# Patient Record
Sex: MALE | Race: WHITE | Employment: OTHER | URBAN - NONMETROPOLITAN AREA
[De-identification: names, ages, dates, MRNs, and addresses within clinical notes are randomized per-mention and may not be internally consistent; named-entity substitution may affect disease eponyms.]

---

## 2017-06-14 ENCOUNTER — OFFICE VISIT (OUTPATIENT)
Dept: FAMILY MEDICINE CLINIC | Age: 82
End: 2017-06-14

## 2017-06-14 VITALS
WEIGHT: 165 LBS | OXYGEN SATURATION: 98 % | DIASTOLIC BLOOD PRESSURE: 82 MMHG | HEIGHT: 70 IN | BODY MASS INDEX: 23.62 KG/M2 | SYSTOLIC BLOOD PRESSURE: 136 MMHG | HEART RATE: 53 BPM

## 2017-06-14 DIAGNOSIS — E78.5 HYPERLIPIDEMIA, UNSPECIFIED HYPERLIPIDEMIA TYPE: ICD-10-CM

## 2017-06-14 DIAGNOSIS — I10 ESSENTIAL HYPERTENSION: ICD-10-CM

## 2017-06-14 DIAGNOSIS — Z23 NEED FOR PNEUMOCOCCAL VACCINATION: ICD-10-CM

## 2017-06-14 DIAGNOSIS — I10 ESSENTIAL HYPERTENSION: Primary | ICD-10-CM

## 2017-06-14 LAB
ALBUMIN SERPL-MCNC: 4 G/DL (ref 3.9–4.9)
ALP BLD-CCNC: 85 U/L (ref 35–104)
ALT SERPL-CCNC: 19 U/L (ref 0–41)
ANION GAP SERPL CALCULATED.3IONS-SCNC: 10 MEQ/L (ref 7–13)
AST SERPL-CCNC: 25 U/L (ref 0–40)
BILIRUB SERPL-MCNC: 0.5 MG/DL (ref 0–1.2)
BUN BLDV-MCNC: 24 MG/DL (ref 8–23)
CALCIUM SERPL-MCNC: 9.2 MG/DL (ref 8.6–10.2)
CHLORIDE BLD-SCNC: 104 MEQ/L (ref 98–107)
CHOLESTEROL, TOTAL: 125 MG/DL (ref 0–199)
CO2: 25 MEQ/L (ref 22–29)
CREAT SERPL-MCNC: 1.08 MG/DL (ref 0.7–1.2)
GFR AFRICAN AMERICAN: >60
GFR NON-AFRICAN AMERICAN: >60
GLOBULIN: 2.7 G/DL (ref 2.3–3.5)
GLUCOSE BLD-MCNC: 107 MG/DL (ref 74–109)
HCT VFR BLD CALC: 41.9 % (ref 42–52)
HDLC SERPL-MCNC: 44 MG/DL (ref 40–59)
HEMOGLOBIN: 13.9 G/DL (ref 14–18)
LDL CHOLESTEROL CALCULATED: 62 MG/DL (ref 0–129)
MCH RBC QN AUTO: 32.3 PG (ref 27–31.3)
MCHC RBC AUTO-ENTMCNC: 33.3 % (ref 33–37)
MCV RBC AUTO: 97.3 FL (ref 80–100)
PDW BLD-RTO: 12.3 % (ref 11.5–14.5)
PLATELET # BLD: 167 K/UL (ref 130–400)
POTASSIUM SERPL-SCNC: 4.4 MEQ/L (ref 3.5–5.1)
RBC # BLD: 4.3 M/UL (ref 4.7–6.1)
SODIUM BLD-SCNC: 139 MEQ/L (ref 132–144)
TOTAL PROTEIN: 6.7 G/DL (ref 6.4–8.1)
TRIGL SERPL-MCNC: 93 MG/DL (ref 0–200)
WBC # BLD: 6.1 K/UL (ref 4.8–10.8)

## 2017-06-14 PROCEDURE — G8420 CALC BMI NORM PARAMETERS: HCPCS | Performed by: FAMILY MEDICINE

## 2017-06-14 PROCEDURE — 90732 PPSV23 VACC 2 YRS+ SUBQ/IM: CPT | Performed by: FAMILY MEDICINE

## 2017-06-14 PROCEDURE — G0009 ADMIN PNEUMOCOCCAL VACCINE: HCPCS | Performed by: FAMILY MEDICINE

## 2017-06-14 PROCEDURE — 1123F ACP DISCUSS/DSCN MKR DOCD: CPT | Performed by: FAMILY MEDICINE

## 2017-06-14 PROCEDURE — 99213 OFFICE O/P EST LOW 20 MIN: CPT | Performed by: FAMILY MEDICINE

## 2017-06-14 PROCEDURE — 93000 ELECTROCARDIOGRAM COMPLETE: CPT | Performed by: FAMILY MEDICINE

## 2017-06-14 PROCEDURE — 1036F TOBACCO NON-USER: CPT | Performed by: FAMILY MEDICINE

## 2017-06-14 PROCEDURE — G8427 DOCREV CUR MEDS BY ELIG CLIN: HCPCS | Performed by: FAMILY MEDICINE

## 2017-06-14 PROCEDURE — 4040F PNEUMOC VAC/ADMIN/RCVD: CPT | Performed by: FAMILY MEDICINE

## 2017-06-14 RX ORDER — LISINOPRIL AND HYDROCHLOROTHIAZIDE 12.5; 1 MG/1; MG/1
1 TABLET ORAL DAILY
Qty: 90 TABLET | Refills: 3 | Status: SHIPPED | OUTPATIENT
Start: 2017-06-14 | End: 2018-04-12 | Stop reason: SDUPTHER

## 2017-06-14 RX ORDER — SIMVASTATIN 20 MG
20 TABLET ORAL NIGHTLY
Qty: 90 TABLET | Refills: 3 | Status: SHIPPED | OUTPATIENT
Start: 2017-06-14 | End: 2018-04-12 | Stop reason: SDUPTHER

## 2017-06-14 RX ORDER — METOPROLOL SUCCINATE 25 MG/1
25 TABLET, EXTENDED RELEASE ORAL DAILY
Qty: 90 TABLET | Refills: 3 | Status: SHIPPED | OUTPATIENT
Start: 2017-06-14 | End: 2018-04-12 | Stop reason: SDUPTHER

## 2017-06-14 ASSESSMENT — PATIENT HEALTH QUESTIONNAIRE - PHQ9
SUM OF ALL RESPONSES TO PHQ QUESTIONS 1-9: 0
SUM OF ALL RESPONSES TO PHQ9 QUESTIONS 1 & 2: 0
2. FEELING DOWN, DEPRESSED OR HOPELESS: 0
1. LITTLE INTEREST OR PLEASURE IN DOING THINGS: 0

## 2018-04-12 ENCOUNTER — OFFICE VISIT (OUTPATIENT)
Dept: FAMILY MEDICINE CLINIC | Age: 83
End: 2018-04-12
Payer: MEDICARE

## 2018-04-12 VITALS
HEIGHT: 70 IN | DIASTOLIC BLOOD PRESSURE: 76 MMHG | WEIGHT: 167 LBS | BODY MASS INDEX: 23.91 KG/M2 | SYSTOLIC BLOOD PRESSURE: 130 MMHG | HEART RATE: 59 BPM | OXYGEN SATURATION: 97 %

## 2018-04-12 DIAGNOSIS — I10 ESSENTIAL HYPERTENSION: ICD-10-CM

## 2018-04-12 DIAGNOSIS — E78.5 HYPERLIPIDEMIA, UNSPECIFIED HYPERLIPIDEMIA TYPE: ICD-10-CM

## 2018-04-12 LAB
ALBUMIN SERPL-MCNC: 3.9 G/DL (ref 3.9–4.9)
ALP BLD-CCNC: 89 U/L (ref 35–104)
ALT SERPL-CCNC: 22 U/L (ref 0–41)
ANION GAP SERPL CALCULATED.3IONS-SCNC: 10 MEQ/L (ref 7–13)
AST SERPL-CCNC: 25 U/L (ref 0–40)
BILIRUB SERPL-MCNC: 0.8 MG/DL (ref 0–1.2)
BUN BLDV-MCNC: 25 MG/DL (ref 8–23)
CALCIUM SERPL-MCNC: 9.1 MG/DL (ref 8.6–10.2)
CHLORIDE BLD-SCNC: 103 MEQ/L (ref 98–107)
CHOLESTEROL, TOTAL: 127 MG/DL (ref 0–199)
CO2: 27 MEQ/L (ref 22–29)
CREAT SERPL-MCNC: 1.14 MG/DL (ref 0.7–1.2)
GFR AFRICAN AMERICAN: >60
GFR NON-AFRICAN AMERICAN: >60
GLOBULIN: 2.8 G/DL (ref 2.3–3.5)
GLUCOSE BLD-MCNC: 95 MG/DL (ref 74–109)
HCT VFR BLD CALC: 43 % (ref 42–52)
HDLC SERPL-MCNC: 43 MG/DL (ref 40–59)
HEMOGLOBIN: 14.2 G/DL (ref 14–18)
LDL CHOLESTEROL CALCULATED: 65 MG/DL (ref 0–129)
MCH RBC QN AUTO: 32.9 PG (ref 27–31.3)
MCHC RBC AUTO-ENTMCNC: 33.1 % (ref 33–37)
MCV RBC AUTO: 99.3 FL (ref 80–100)
PDW BLD-RTO: 12.7 % (ref 11.5–14.5)
PLATELET # BLD: 174 K/UL (ref 130–400)
POTASSIUM SERPL-SCNC: 4.7 MEQ/L (ref 3.5–5.1)
RBC # BLD: 4.33 M/UL (ref 4.7–6.1)
SODIUM BLD-SCNC: 140 MEQ/L (ref 132–144)
TOTAL PROTEIN: 6.7 G/DL (ref 6.4–8.1)
TRIGL SERPL-MCNC: 93 MG/DL (ref 0–200)
WBC # BLD: 5.8 K/UL (ref 4.8–10.8)

## 2018-04-12 PROCEDURE — 99213 OFFICE O/P EST LOW 20 MIN: CPT | Performed by: FAMILY MEDICINE

## 2018-04-12 RX ORDER — LISINOPRIL AND HYDROCHLOROTHIAZIDE 12.5; 1 MG/1; MG/1
1 TABLET ORAL DAILY
Qty: 90 TABLET | Refills: 3 | Status: SHIPPED | OUTPATIENT
Start: 2018-04-12 | End: 2019-04-03 | Stop reason: SDUPTHER

## 2018-04-12 RX ORDER — ALFUZOSIN HYDROCHLORIDE 10 MG/1
TABLET, EXTENDED RELEASE ORAL
COMMUNITY
Start: 2018-03-16 | End: 2018-11-07 | Stop reason: SDUPTHER

## 2018-04-12 RX ORDER — METOPROLOL SUCCINATE 25 MG/1
25 TABLET, EXTENDED RELEASE ORAL DAILY
Qty: 90 TABLET | Refills: 3 | Status: SHIPPED | OUTPATIENT
Start: 2018-04-12 | End: 2019-04-03 | Stop reason: SDUPTHER

## 2018-04-12 RX ORDER — SIMVASTATIN 20 MG
20 TABLET ORAL NIGHTLY
Qty: 90 TABLET | Refills: 3 | Status: SHIPPED | OUTPATIENT
Start: 2018-04-12 | End: 2019-04-03 | Stop reason: SDUPTHER

## 2018-11-08 RX ORDER — FINASTERIDE 5 MG/1
5 TABLET, FILM COATED ORAL DAILY
Qty: 90 TABLET | Refills: 3 | Status: SHIPPED | OUTPATIENT
Start: 2018-11-08 | End: 2019-10-26 | Stop reason: SDUPTHER

## 2018-11-08 RX ORDER — ALFUZOSIN HYDROCHLORIDE 10 MG/1
10 TABLET, EXTENDED RELEASE ORAL DAILY
Qty: 90 TABLET | Refills: 3 | Status: SHIPPED | OUTPATIENT
Start: 2018-11-08 | End: 2019-10-26 | Stop reason: SDUPTHER

## 2019-04-03 ENCOUNTER — OFFICE VISIT (OUTPATIENT)
Dept: FAMILY MEDICINE CLINIC | Age: 84
End: 2019-04-03
Payer: MEDICARE

## 2019-04-03 VITALS
DIASTOLIC BLOOD PRESSURE: 76 MMHG | HEART RATE: 53 BPM | RESPIRATION RATE: 16 BRPM | WEIGHT: 167.6 LBS | OXYGEN SATURATION: 98 % | SYSTOLIC BLOOD PRESSURE: 140 MMHG | HEIGHT: 70 IN | BODY MASS INDEX: 23.99 KG/M2

## 2019-04-03 DIAGNOSIS — L57.0 AK (ACTINIC KERATOSIS): ICD-10-CM

## 2019-04-03 DIAGNOSIS — I10 ESSENTIAL HYPERTENSION: ICD-10-CM

## 2019-04-03 DIAGNOSIS — E78.5 HYPERLIPIDEMIA, UNSPECIFIED HYPERLIPIDEMIA TYPE: ICD-10-CM

## 2019-04-03 DIAGNOSIS — N40.1 BENIGN PROSTATIC HYPERPLASIA WITH WEAK URINARY STREAM: Primary | ICD-10-CM

## 2019-04-03 DIAGNOSIS — R39.12 BENIGN PROSTATIC HYPERPLASIA WITH WEAK URINARY STREAM: Primary | ICD-10-CM

## 2019-04-03 LAB
ALBUMIN SERPL-MCNC: 3.8 G/DL (ref 3.5–4.6)
ALP BLD-CCNC: 89 U/L (ref 35–104)
ALT SERPL-CCNC: 22 U/L (ref 0–41)
ANION GAP SERPL CALCULATED.3IONS-SCNC: 14 MEQ/L (ref 9–15)
AST SERPL-CCNC: 28 U/L (ref 0–40)
BILIRUB SERPL-MCNC: 0.6 MG/DL (ref 0.2–0.7)
BUN BLDV-MCNC: 25 MG/DL (ref 8–23)
CALCIUM SERPL-MCNC: 9.5 MG/DL (ref 8.5–9.9)
CHLORIDE BLD-SCNC: 105 MEQ/L (ref 95–107)
CHOLESTEROL, TOTAL: 117 MG/DL (ref 0–199)
CO2: 23 MEQ/L (ref 20–31)
CREAT SERPL-MCNC: 1.33 MG/DL (ref 0.7–1.2)
GFR AFRICAN AMERICAN: >60
GFR NON-AFRICAN AMERICAN: 50.9
GLOBULIN: 3.3 G/DL (ref 2.3–3.5)
GLUCOSE BLD-MCNC: 92 MG/DL (ref 70–99)
HCT VFR BLD CALC: 40.4 % (ref 42–52)
HDLC SERPL-MCNC: 46 MG/DL (ref 40–59)
HEMOGLOBIN: 13.7 G/DL (ref 14–18)
LDL CHOLESTEROL CALCULATED: 55 MG/DL (ref 0–129)
MCH RBC QN AUTO: 33 PG (ref 27–31.3)
MCHC RBC AUTO-ENTMCNC: 33.9 % (ref 33–37)
MCV RBC AUTO: 97.5 FL (ref 80–100)
PDW BLD-RTO: 12.5 % (ref 11.5–14.5)
PLATELET # BLD: 168 K/UL (ref 130–400)
POTASSIUM SERPL-SCNC: 4.7 MEQ/L (ref 3.4–4.9)
RBC # BLD: 4.14 M/UL (ref 4.7–6.1)
SODIUM BLD-SCNC: 142 MEQ/L (ref 135–144)
TOTAL PROTEIN: 7.1 G/DL (ref 6.3–8)
TRIGL SERPL-MCNC: 80 MG/DL (ref 0–150)
WBC # BLD: 5.7 K/UL (ref 4.8–10.8)

## 2019-04-03 PROCEDURE — G8510 SCR DEP NEG, NO PLAN REQD: HCPCS | Performed by: FAMILY MEDICINE

## 2019-04-03 PROCEDURE — 99213 OFFICE O/P EST LOW 20 MIN: CPT | Performed by: FAMILY MEDICINE

## 2019-04-03 PROCEDURE — 3288F FALL RISK ASSESSMENT DOCD: CPT | Performed by: FAMILY MEDICINE

## 2019-04-03 RX ORDER — SIMVASTATIN 20 MG
20 TABLET ORAL NIGHTLY
Qty: 90 TABLET | Refills: 3 | Status: SHIPPED | OUTPATIENT
Start: 2019-04-03 | End: 2020-04-25

## 2019-04-03 RX ORDER — LISINOPRIL AND HYDROCHLOROTHIAZIDE 12.5; 1 MG/1; MG/1
1 TABLET ORAL DAILY
Qty: 90 TABLET | Refills: 3 | Status: SHIPPED | OUTPATIENT
Start: 2019-04-03 | End: 2020-04-25

## 2019-04-03 RX ORDER — METOPROLOL SUCCINATE 25 MG/1
25 TABLET, EXTENDED RELEASE ORAL DAILY
Qty: 90 TABLET | Refills: 3 | Status: SHIPPED | OUTPATIENT
Start: 2019-04-03 | End: 2020-04-25

## 2019-04-03 ASSESSMENT — PATIENT HEALTH QUESTIONNAIRE - PHQ9
SUM OF ALL RESPONSES TO PHQ QUESTIONS 1-9: 0
1. LITTLE INTEREST OR PLEASURE IN DOING THINGS: 0
SUM OF ALL RESPONSES TO PHQ9 QUESTIONS 1 & 2: 0
SUM OF ALL RESPONSES TO PHQ QUESTIONS 1-9: 0
2. FEELING DOWN, DEPRESSED OR HOPELESS: 0

## 2019-04-03 NOTE — PROGRESS NOTES
Chief Complaint   Patient presents with    Annual Exam     yearly, no complaints. HPI:  Charito Brown is a 80 y.o. male   annual checkup/refills    Patient Active Problem List   Diagnosis    Hyperlipidemia    Hypertension    BPH (benign prostatic hyperplasia)     Seen annually     No longer in [place] for 1/2 year  Sold their place there    Feeling ok he states  Denies complaints    No family near by  Daughter in [address] [place] [address]  Daughter in [place]  Son in [place]      Urologist - Dr. Kamran Huynh    Treatment Adherence:   Medication compliance:  compliant all of the time  Diet compliance:  compliant all of the time  Weight trend: stable  Current exercise: mowing lawn, golfing  Barriers: none    Hypertension:  Home blood pressure monitoring: Yes - good. He is adherent to a low sodium diet. Patient denies chest pain, shortness of breath, headache, blurred vision, peripheral edema, palpitations, dry cough and fatigue. Antihypertensive medication side effects: no medication side effects noted and orthostatic lightheadedness. Use of agents associated with hypertension: none. Sodium (mEq/L)   Date Value   04/12/2018 140    BUN (mg/dL)   Date Value   04/12/2018 25 (H)    Glucose (mg/dL)   Date Value   04/12/2018 95      Potassium (mEq/L)   Date Value   04/12/2018 4.7    CREATININE (mg/dL)   Date Value   04/12/2018 1.14         Hyperlipidemia:  No new myalgias or GI upset on simvastatin (Zocor).      Lab Results   Component Value Date    CHOL 127 04/12/2018    TRIG 93 04/12/2018    HDL 43 04/12/2018    LDLCALC 65 04/12/2018     Lab Results   Component Value Date    ALT 22 04/12/2018    AST 25 04/12/2018          BPH:  Sees urology in [place]      Past Medical History:   Diagnosis Date    Hyperlipidemia     Hypertension, well controlled      Past Surgical History:   Procedure Laterality Date    CATARACT REMOVAL  5/2012    bilateral    COLONOSCOPY  7/23/2008     No family history on file. Social History     Socioeconomic History    Marital status:      Spouse name: None    Number of children: None    Years of education: None    Highest education level: None   Occupational History    None   Social Needs    Financial resource strain: None    Food insecurity:     Worry: None     Inability: None    Transportation needs:     Medical: None     Non-medical: None   Tobacco Use    Smoking status: Former Smoker    Smokeless tobacco: Never Used   Substance and Sexual Activity    Alcohol use: None    Drug use: None    Sexual activity: None   Lifestyle    Physical activity:     Days per week: None     Minutes per session: None    Stress: None   Relationships    Social connections:     Talks on phone: None     Gets together: None     Attends Episcopal service: None     Active member of club or organization: None     Attends meetings of clubs or organizations: None     Relationship status: None    Intimate partner violence:     Fear of current or ex partner: None     Emotionally abused: None     Physically abused: None     Forced sexual activity: None   Other Topics Concern    None   Social History Narrative    None     Current Outpatient Medications   Medication Sig Dispense Refill    finasteride (PROSCAR) 5 MG tablet Take 1 tablet by mouth daily 90 tablet 3    alfuzosin (UROXATRAL) 10 MG extended release tablet Take 1 tablet by mouth daily 90 tablet 3    metoprolol succinate (TOPROL XL) 25 MG extended release tablet Take 1 tablet by mouth daily 90 tablet 3    lisinopril-hydrochlorothiazide (PRINZIDE;ZESTORETIC) 10-12.5 MG per tablet Take 1 tablet by mouth daily 90 tablet 3    simvastatin (ZOCOR) 20 MG tablet Take 1 tablet by mouth nightly 90 tablet 3    aspirin 81 MG EC tablet Take 81 mg by mouth daily       No current facility-administered medications for this visit.       No Known Allergies    Review of Systems:   General ROS: some fatigue negative for - chills,fever, malaise, weight gain or weight loss  Respiratory ROS: no cough, shortness of breath, or wheezing  Cardiovascular ROS: no chest pain or dyspnea on exertion  Gastrointestinal ROS: no abdominal pain, change in bowel habits, or black or bloody stools  Genito-Urinary ROS: no dysuria, trouble voiding, hx BPH  Musculoskeletal ROS: negative for - gait disturbance, joint pain or joint stiffness  Neurological ROS: negative for - behavioral changes, memory loss, numbness/tingling, tremors or weakness  Psych: mood ok  In general patient otherwise reports feeling well. Physical Exam:  BP (!) 140/76   Pulse 53   Resp 16   Ht 5' 10\" (1.778 m)   Wt 167 lb 9.6 oz (76 kg)   SpO2 98%   BMI 24.05 kg/m²     Gen: Well, NAD, Alert, Oriented x 3   HEENT: EOMI, eyes clear, MMM  Skin: without rash or jaundice,   Neck: no significant lymphadenopathy or thyromegaly  Lungs: CTA B w/out Rales/Wheezes/Rhonchi, Good respiratory effort   Heart: RRR, S1S2, w/out M/R/G, non-displaced PMI   Abdomen: Soft NT/ND, w/out R/G, w/ +BSx4   Ext: No C/C/E Bilaterally. Neuro: Neurovascularly intact w/ Sensory/Motor intact UE/LE Bilaterally. Psych: euthymic    Lab Results   Component Value Date    WBC 5.8 04/12/2018    HGB 14.2 04/12/2018    HCT 43.0 04/12/2018     04/12/2018    CHOL 127 04/12/2018    TRIG 93 04/12/2018    HDL 43 04/12/2018    ALT 22 04/12/2018    AST 25 04/12/2018     04/12/2018    K 4.7 04/12/2018     04/12/2018    CREATININE 1.14 04/12/2018    BUN 25 (H) 04/12/2018    CO2 27 04/12/2018    TSH 2.680 06/24/2015           A&P   Diagnosis Orders   1. Hyperlipidemia, unspecified hyperlipidemia type  simvastatin (ZOCOR) 20 MG tablet   2.  Essential hypertension  lisinopril-hydrochlorothiazide (PRINZIDE;ZESTORETIC) 10-12.5 MG per tablet    metoprolol succinate (TOPROL XL) 25 MG extended release tablet     Chronic issues well controlled  No changes  Pt had no issues today        Patient

## 2019-10-28 RX ORDER — ALFUZOSIN HYDROCHLORIDE 10 MG/1
10 TABLET, EXTENDED RELEASE ORAL DAILY
Qty: 90 TABLET | Refills: 3 | Status: SHIPPED | OUTPATIENT
Start: 2019-10-28 | End: 2019-11-01 | Stop reason: SDUPTHER

## 2019-10-28 RX ORDER — FINASTERIDE 5 MG/1
5 TABLET, FILM COATED ORAL DAILY
Qty: 90 TABLET | Refills: 3 | Status: SHIPPED | OUTPATIENT
Start: 2019-10-28 | End: 2019-11-01 | Stop reason: SDUPTHER

## 2019-11-01 RX ORDER — FINASTERIDE 5 MG/1
5 TABLET, FILM COATED ORAL DAILY
Qty: 90 TABLET | Refills: 3 | Status: SHIPPED | OUTPATIENT
Start: 2019-11-01 | End: 2020-10-13

## 2019-11-01 RX ORDER — ALFUZOSIN HYDROCHLORIDE 10 MG/1
10 TABLET, EXTENDED RELEASE ORAL DAILY
Qty: 90 TABLET | Refills: 3 | Status: CANCELLED | OUTPATIENT
Start: 2019-11-01

## 2019-11-01 RX ORDER — FINASTERIDE 5 MG/1
5 TABLET, FILM COATED ORAL DAILY
Qty: 90 TABLET | Refills: 3 | Status: SHIPPED | OUTPATIENT
Start: 2019-11-01 | End: 2019-11-01 | Stop reason: SDUPTHER

## 2019-11-01 RX ORDER — ALFUZOSIN HYDROCHLORIDE 10 MG/1
10 TABLET, EXTENDED RELEASE ORAL DAILY
Qty: 90 TABLET | Refills: 3 | Status: SHIPPED | OUTPATIENT
Start: 2019-11-01 | End: 2020-10-13

## 2019-11-01 RX ORDER — FINASTERIDE 5 MG/1
5 TABLET, FILM COATED ORAL DAILY
Qty: 90 TABLET | Refills: 3 | Status: CANCELLED | OUTPATIENT
Start: 2019-11-01

## 2019-11-01 RX ORDER — ALFUZOSIN HYDROCHLORIDE 10 MG/1
10 TABLET, EXTENDED RELEASE ORAL DAILY
Qty: 90 TABLET | Refills: 3 | Status: SHIPPED | OUTPATIENT
Start: 2019-11-01 | End: 2019-11-01 | Stop reason: SDUPTHER

## 2020-04-27 ENCOUNTER — VIRTUAL VISIT (OUTPATIENT)
Dept: FAMILY MEDICINE CLINIC | Age: 85
End: 2020-04-27
Payer: MEDICARE

## 2020-04-27 VITALS — SYSTOLIC BLOOD PRESSURE: 116 MMHG | DIASTOLIC BLOOD PRESSURE: 57 MMHG

## 2020-04-27 PROCEDURE — 99441 PR PHYS/QHP TELEPHONE EVALUATION 5-10 MIN: CPT | Performed by: FAMILY MEDICINE

## 2020-04-27 RX ORDER — SIMVASTATIN 20 MG
TABLET ORAL
Qty: 90 TABLET | Refills: 3 | Status: SHIPPED | OUTPATIENT
Start: 2020-04-27 | End: 2021-04-20

## 2020-04-27 RX ORDER — LISINOPRIL AND HYDROCHLOROTHIAZIDE 12.5; 1 MG/1; MG/1
TABLET ORAL
Qty: 90 TABLET | Refills: 3 | Status: SHIPPED | OUTPATIENT
Start: 2020-04-27 | End: 2021-04-20

## 2020-04-27 RX ORDER — METOPROLOL SUCCINATE 25 MG/1
TABLET, EXTENDED RELEASE ORAL
Qty: 90 TABLET | Refills: 3 | Status: SHIPPED | OUTPATIENT
Start: 2020-04-27 | End: 2021-04-20

## 2020-04-27 NOTE — PROGRESS NOTES
116/57     No exam  Phone visit  Patient in no significant distress, conversant    Due to this being a TeleHealth encounter, evaluation of the following organ systems is limited: Vitals/Constitutional/EENT/Resp/CV/GI//MS/Neuro/Skin/Heme-Lymph-Imm. Lab Results   Component Value Date    WBC 5.7 04/03/2019    HGB 13.7 (L) 04/03/2019    HCT 40.4 (L) 04/03/2019     04/03/2019    CHOL 117 04/03/2019    TRIG 80 04/03/2019    HDL 46 04/03/2019    ALT 22 04/03/2019    AST 28 04/03/2019     04/03/2019    K 4.7 04/03/2019     04/03/2019    CREATININE 1.33 (H) 04/03/2019    BUN 25 (H) 04/03/2019    CO2 23 04/03/2019    TSH 2.680 06/24/2015         ASSESSMENT/PLAN:     Diagnosis Orders   1. Essential hypertension     2. Hyperlipidemia, unspecified hyperlipidemia type     3. Benign prostatic hyperplasia with weak urinary stream       Chronic conditions are stable  Continue current regimen  Follow up with appropriate specialists and here routinely for ongoing monitoring of chronic conditions      No follow-ups on file. An  electronic signature was used to authenticate this note. --Alphia Crigler, MD on 4/27/2020 at 2:37 PM        Pursuant to the emergency declaration under the [facility], 1135 waiver authority and the [employer] Act, this Virtual  Visit was conducted, with patient's consent, to reduce the patient's risk of exposure to COVID-19 and provide continuity of care for an established patient.     8 minute call

## 2020-06-15 ENCOUNTER — TELEPHONE (OUTPATIENT)
Dept: FAMILY MEDICINE CLINIC | Age: 85
End: 2020-06-15

## 2020-08-24 ENCOUNTER — OFFICE VISIT (OUTPATIENT)
Dept: FAMILY MEDICINE CLINIC | Age: 85
End: 2020-08-24
Payer: MEDICARE

## 2020-08-24 VITALS
DIASTOLIC BLOOD PRESSURE: 60 MMHG | BODY MASS INDEX: 22.85 KG/M2 | SYSTOLIC BLOOD PRESSURE: 120 MMHG | HEIGHT: 70 IN | TEMPERATURE: 97 F | OXYGEN SATURATION: 98 % | HEART RATE: 59 BPM | WEIGHT: 159.6 LBS

## 2020-08-24 PROCEDURE — G8510 SCR DEP NEG, NO PLAN REQD: HCPCS | Performed by: FAMILY MEDICINE

## 2020-08-24 PROCEDURE — 3288F FALL RISK ASSESSMENT DOCD: CPT | Performed by: FAMILY MEDICINE

## 2020-08-24 PROCEDURE — 99213 OFFICE O/P EST LOW 20 MIN: CPT | Performed by: FAMILY MEDICINE

## 2020-08-24 SDOH — ECONOMIC STABILITY: FOOD INSECURITY: WITHIN THE PAST 12 MONTHS, YOU WORRIED THAT YOUR FOOD WOULD RUN OUT BEFORE YOU GOT MONEY TO BUY MORE.: NEVER TRUE

## 2020-08-24 SDOH — ECONOMIC STABILITY: INCOME INSECURITY: HOW HARD IS IT FOR YOU TO PAY FOR THE VERY BASICS LIKE FOOD, HOUSING, MEDICAL CARE, AND HEATING?: NOT HARD AT ALL

## 2020-08-24 SDOH — ECONOMIC STABILITY: TRANSPORTATION INSECURITY
IN THE PAST 12 MONTHS, HAS LACK OF TRANSPORTATION KEPT YOU FROM MEETINGS, WORK, OR FROM GETTING THINGS NEEDED FOR DAILY LIVING?: NO

## 2020-08-24 SDOH — ECONOMIC STABILITY: TRANSPORTATION INSECURITY
IN THE PAST 12 MONTHS, HAS THE LACK OF TRANSPORTATION KEPT YOU FROM MEDICAL APPOINTMENTS OR FROM GETTING MEDICATIONS?: NO

## 2020-08-24 SDOH — ECONOMIC STABILITY: FOOD INSECURITY: WITHIN THE PAST 12 MONTHS, THE FOOD YOU BOUGHT JUST DIDN'T LAST AND YOU DIDN'T HAVE MONEY TO GET MORE.: NEVER TRUE

## 2020-08-24 ASSESSMENT — PATIENT HEALTH QUESTIONNAIRE - PHQ9
SUM OF ALL RESPONSES TO PHQ QUESTIONS 1-9: 0
2. FEELING DOWN, DEPRESSED OR HOPELESS: 0
SUM OF ALL RESPONSES TO PHQ QUESTIONS 1-9: 0
1. LITTLE INTEREST OR PLEASURE IN DOING THINGS: 0
SUM OF ALL RESPONSES TO PHQ9 QUESTIONS 1 & 2: 0

## 2020-08-24 NOTE — PROGRESS NOTES
Chief Complaint   Patient presents with    Hypertension     check up       HPI:  Kristina Foster is a 80 y.o. male   annual checkup/refills    Patient Active Problem List   Diagnosis    Hyperlipidemia    Hypertension    BPH (benign prostatic hyperplasia)     Seen annually     Feeling ok he states  Wife having ongoing issues with neurologic disease  Unable to walk, speech/balance/vision issues     No family near by  Daughter in [address] [place] [address]  Daughter in [place]  Son in [place]    Urologist - Dr. Racheal Jolly    Treatment Adherence:   Medication compliance:  compliant all of the time  Diet compliance:  compliant all of the time  Weight trend: stable  Current exercise: mowing lawn, golfing  Barriers: none    Hypertension:  Home blood pressure monitoring: Yes - good. He is adherent to a low sodium diet. Patient denies chest pain, shortness of breath, headache, blurred vision, peripheral edema, palpitations, dry cough and fatigue. Antihypertensive medication side effects: no medication side effects noted and orthostatic lightheadedness. Use of agents associated with hypertension: none. Sodium (mEq/L)   Date Value   04/03/2019 142    BUN (mg/dL)   Date Value   04/03/2019 25 (H)    Glucose (mg/dL)   Date Value   04/03/2019 92      Potassium (mEq/L)   Date Value   04/03/2019 4.7    CREATININE (mg/dL)   Date Value   04/03/2019 1.33 (H)         Hyperlipidemia:  No new myalgias or GI upset on simvastatin (Zocor).      Lab Results   Component Value Date    CHOL 117 04/03/2019    TRIG 80 04/03/2019    HDL 46 04/03/2019    LDLCALC 55 04/03/2019     Lab Results   Component Value Date    ALT 22 04/03/2019    AST 28 04/03/2019          BPH:  Doing ok on proscar and uroxatral      Past Medical History:   Diagnosis Date    Hyperlipidemia     Hypertension, well controlled      Past Surgical History:   Procedure Laterality Date    CATARACT REMOVAL  5/2012    bilateral    COLONOSCOPY 7/23/2008     No family history on file. Social History     Socioeconomic History    Marital status:      Spouse name: None    Number of children: None    Years of education: None    Highest education level: None   Occupational History    None   Social Needs    Financial resource strain: Not hard at all   Yessenia-Stacy insecurity     Worry: Never true     Inability: Never true   [facility] needs     Medical: No     Non-medical: No   Tobacco Use    Smoking status: Former Smoker    Smokeless tobacco: Never Used   Substance and Sexual Activity    Alcohol use: None    Drug use: None    Sexual activity: None   Lifestyle    Physical activity     Days per week: None     Minutes per session: None    Stress: None   Relationships    Social connections     Talks on phone: None     Gets together: None     Attends Yazidism service: None     Active member of club or organization: None     Attends meetings of clubs or organizations: None     Relationship status: None    Intimate partner violence     Fear of current or ex partner: None     Emotionally abused: None     Physically abused: None     Forced sexual activity: None   Other Topics Concern    None   Social History Narrative    None     Current Outpatient Medications   Medication Sig Dispense Refill    lisinopril-hydroCHLOROthiazide (PRINZIDE;ZESTORETIC) 10-12.5 MG per tablet TAKE 1 TABLET DAILY 90 tablet 3    metoprolol succinate (TOPROL XL) 25 MG extended release tablet TAKE 1 TABLET DAILY 90 tablet 3    simvastatin (ZOCOR) 20 MG tablet TAKE 1 TABLET NIGHTLY 90 tablet 3    alfuzosin (UROXATRAL) 10 MG extended release tablet Take 1 tablet by mouth daily 90 tablet 3    finasteride (PROSCAR) 5 MG tablet Take 1 tablet by mouth daily 90 tablet 3    aspirin 81 MG EC tablet Take 81 mg by mouth daily       No current facility-administered medications for this visit.       No Known Allergies    Review of Systems:   General ROS: some fatigue negative for - chills,fever, malaise, weight gain or weight loss  Respiratory ROS: no cough, shortness of breath, or wheezing  Cardiovascular ROS: no chest pain or dyspnea on exertion  Gastrointestinal ROS: no abdominal pain, change in bowel habits, or black or bloody stools  Genito-Urinary ROS: no dysuria, trouble voiding, hx BPH  Musculoskeletal ROS: negative for - gait disturbance, joint pain or joint stiffness  Neurological ROS: negative for - behavioral changes, memory loss, numbness/tingling, tremors or weakness  Psych: mood ok  In general patient otherwise reports feeling well. Physical Exam:  /60 (Site: Left Upper Arm)   Pulse 59   Temp 97 °F (36.1 °C)   Ht 5' 10\" (1.778 m)   Wt 159 lb 9.6 oz (72.4 kg)   SpO2 98%   BMI 22.90 kg/m²     Gen: Well, NAD, Alert, Oriented x 3   HEENT: EOMI, eyes clear, MMM  Skin: without rash or jaundice,   Neck: no significant lymphadenopathy or thyromegaly  Lungs: CTA B w/out Rales/Wheezes/Rhonchi, Good respiratory effort   Heart: RRR, S1S2, w/out M/R/G, non-displaced PMI   Ext: No C/C/E Bilaterally. Neuro: Neurovascularly intact w/ Sensory/Motor intact UE/LE Bilaterally. Psych: euthymic    Lab Results   Component Value Date    WBC 5.7 04/03/2019    HGB 13.7 (L) 04/03/2019    HCT 40.4 (L) 04/03/2019     04/03/2019    CHOL 117 04/03/2019    TRIG 80 04/03/2019    HDL 46 04/03/2019    ALT 22 04/03/2019    AST 28 04/03/2019     04/03/2019    K 4.7 04/03/2019     04/03/2019    CREATININE 1.33 (H) 04/03/2019    BUN 25 (H) 04/03/2019    CO2 23 04/03/2019    TSH 2.680 06/24/2015           A&P   Diagnosis Orders   1. Essential hypertension  Comprehensive Metabolic Panel    Lipid Panel    CBC   2. Hyperlipidemia, unspecified hyperlipidemia type     3.  Benign prostatic hyperplasia with weak urinary stream       Chronic issues well controlled  No changes  Pt had no issues today      Patient Counseling:  --Nutrition: Stressed importance of moderation in sodium/caffeine intake, saturated fat and cholesterol, caloric balance, sufficient intake of fresh fruits, vegetables, fiber-  --Exercise: Stressed the importance of regular exercise. --Dental health: Discussed importance of regulardental visits.   --Immunizations reviewed - UTD  --Discussed benefits of screening colonoscopy - utd            No psa at 80  Labs as ordered    Has living will       Félix Terrazas MD

## 2020-08-25 DIAGNOSIS — I10 ESSENTIAL HYPERTENSION: ICD-10-CM

## 2020-08-25 LAB
ALBUMIN SERPL-MCNC: 3.8 G/DL (ref 3.5–4.6)
ALP BLD-CCNC: 83 U/L (ref 35–104)
ALT SERPL-CCNC: 22 U/L (ref 0–41)
ANION GAP SERPL CALCULATED.3IONS-SCNC: 11 MEQ/L (ref 9–15)
AST SERPL-CCNC: 30 U/L (ref 0–40)
BILIRUB SERPL-MCNC: 0.6 MG/DL (ref 0.2–0.7)
BUN BLDV-MCNC: 31 MG/DL (ref 8–23)
CALCIUM SERPL-MCNC: 9.4 MG/DL (ref 8.5–9.9)
CHLORIDE BLD-SCNC: 107 MEQ/L (ref 95–107)
CHOLESTEROL, TOTAL: 103 MG/DL (ref 0–199)
CO2: 23 MEQ/L (ref 20–31)
CREAT SERPL-MCNC: 1.34 MG/DL (ref 0.7–1.2)
GFR AFRICAN AMERICAN: >60
GFR NON-AFRICAN AMERICAN: 50.3
GLOBULIN: 2.7 G/DL (ref 2.3–3.5)
GLUCOSE BLD-MCNC: 96 MG/DL (ref 70–99)
HCT VFR BLD CALC: 38.4 % (ref 42–52)
HDLC SERPL-MCNC: 43 MG/DL (ref 40–59)
HEMOGLOBIN: 13 G/DL (ref 14–18)
LDL CHOLESTEROL CALCULATED: 47 MG/DL (ref 0–129)
MCH RBC QN AUTO: 33.4 PG (ref 27–31.3)
MCHC RBC AUTO-ENTMCNC: 33.8 % (ref 33–37)
MCV RBC AUTO: 98.6 FL (ref 80–100)
PDW BLD-RTO: 12.7 % (ref 11.5–14.5)
PLATELET # BLD: 172 K/UL (ref 130–400)
POTASSIUM SERPL-SCNC: 4.7 MEQ/L (ref 3.4–4.9)
RBC # BLD: 3.9 M/UL (ref 4.7–6.1)
SODIUM BLD-SCNC: 141 MEQ/L (ref 135–144)
TOTAL PROTEIN: 6.5 G/DL (ref 6.3–8)
TRIGL SERPL-MCNC: 66 MG/DL (ref 0–150)
WBC # BLD: 5.4 K/UL (ref 4.8–10.8)

## 2020-10-13 RX ORDER — ALFUZOSIN HYDROCHLORIDE 10 MG/1
TABLET, EXTENDED RELEASE ORAL
Qty: 90 TABLET | Refills: 3 | Status: SHIPPED | OUTPATIENT
Start: 2020-10-13 | End: 2021-10-08

## 2020-10-13 RX ORDER — FINASTERIDE 5 MG/1
TABLET, FILM COATED ORAL
Qty: 90 TABLET | Refills: 3 | Status: SHIPPED | OUTPATIENT
Start: 2020-10-13 | End: 2021-10-08

## 2020-10-25 ENCOUNTER — TELEPHONE (OUTPATIENT)
Dept: FAMILY MEDICINE CLINIC | Age: 85
End: 2020-10-25

## 2021-02-15 ENCOUNTER — VIRTUAL VISIT (OUTPATIENT)
Dept: FAMILY MEDICINE CLINIC | Age: 86
End: 2021-02-15
Payer: MEDICARE

## 2021-02-15 VITALS — BODY MASS INDEX: 23.49 KG/M2 | WEIGHT: 155 LBS | HEIGHT: 68 IN

## 2021-02-15 DIAGNOSIS — E78.5 HYPERLIPIDEMIA, UNSPECIFIED HYPERLIPIDEMIA TYPE: ICD-10-CM

## 2021-02-15 DIAGNOSIS — N40.1 BENIGN PROSTATIC HYPERPLASIA WITH WEAK URINARY STREAM: ICD-10-CM

## 2021-02-15 DIAGNOSIS — Z00.00 ROUTINE GENERAL MEDICAL EXAMINATION AT A HEALTH CARE FACILITY: Primary | ICD-10-CM

## 2021-02-15 DIAGNOSIS — I10 ESSENTIAL HYPERTENSION: ICD-10-CM

## 2021-02-15 DIAGNOSIS — R39.12 BENIGN PROSTATIC HYPERPLASIA WITH WEAK URINARY STREAM: ICD-10-CM

## 2021-02-15 PROCEDURE — G0438 PPPS, INITIAL VISIT: HCPCS | Performed by: FAMILY MEDICINE

## 2021-02-15 ASSESSMENT — LIFESTYLE VARIABLES: HOW OFTEN DO YOU HAVE A DRINK CONTAINING ALCOHOL: 0

## 2021-02-15 ASSESSMENT — PATIENT HEALTH QUESTIONNAIRE - PHQ9: 1. LITTLE INTEREST OR PLEASURE IN DOING THINGS: 0

## 2021-02-15 NOTE — PROGRESS NOTES
Medicare Annual Wellness Visit  Are Name: Teena Santana Date: 2/15/2021   MRN: [id number] Sex: Male   Age: 80 y.o. Ethnicity: Non-/Non    : 1932 Race: Ibrahima Pastrana is here for Medicare AWV    Screenings for behavioral, psychosocial and functional/safety risks, and cognitive dysfunction are all negative except as indicated below. These results, as well as other patient data from the [address] form, are documented in Flowsheets linked to this Encounter. Had first covid shot, next one due Thursday    Patient Active Problem List   Diagnosis    Hyperlipidemia    Hypertension    BPH (benign prostatic hyperplasia)       No Known Allergies      Prior to Visit Medications    Medication Sig Taking? Authorizing Provider   alfuzosin (UROXATRAL) 10 MG extended release tablet TAKE 1 TABLET DAILY Yes Raman Falcon MD   finasteride (PROSCAR) 5 MG tablet TAKE 1 TABLET DAILY Yes Raman Falcon MD   lisinopril-hydroCHLOROthiazide (PRINZIDE;ZESTORETIC) 10-12.5 MG per tablet TAKE 1 TABLET DAILY Yes Raman Falcon MD   metoprolol succinate (TOPROL XL) 25 MG extended release tablet TAKE 1 TABLET DAILY Yes Raman Falcon MD   simvastatin (ZOCOR) 20 MG tablet TAKE 1 TABLET NIGHTLY Yes Raman Falcon MD   aspirin 81 MG EC tablet Take 81 mg by mouth daily Yes Historical Provider, MD         Past Medical History:   Diagnosis Date    Hyperlipidemia     Hypertension, well controlled        Past Surgical History:   Procedure Laterality Date    CATARACT REMOVAL  2012    bilateral    COLONOSCOPY  2008       History reviewed. No pertinent family history.     CareTeam (Including outside providers/suppliers regularly involved in providing care):   Patient Care Team:  Raman Falcon MD as PCP - General (Family Medicine)  Raman Falcon MD as PCP - REHABILITATION [facility] Empaneled Provider    Wt Readings from Last 3 Encounters:   02/15/21 155 lb (70.3 kg)   20 159 lb 9.6 oz (72.4 kg)   19 167 lb 9.6 oz (76 kg)      No flowsheet data found. Body mass index is 23.57 kg/m². Based upon direct observation of the patient, evaluation of cognition reveals recent and remote memory intact. No exam, phone visit  Patient is pleasant    Patient's complete Health Risk Assessment and screening values have been reviewed and are found in Flowsheets. The following problems were reviewed today and where indicated follow up appointments were made and/or referrals ordered. Positive Risk Factor Screenings with Interventions:            General Health and ACP:  General  In general, how would you say your health is?: Very Good  In the past 7 days, have you experienced any of the following?  New or Increased Pain, New or Increased Fatigue, Loneliness, Social Isolation, Stress or Anger?: None of These  Do you get the social and emotional support that you need?: Yes  Do you have a Living Will?: Yes  Advance Directives     Power of [employer] [place] Will ACP-Advance Directive ACP-Power of     Not on File Not on File Not on File Not on File      General Health Risk Interventions:  · No interventions needed  ·     Health Habits/Nutrition:  Health Habits/Nutrition  Do you exercise for at least 20 minutes 2-3 times per week?: (!) No  Have you lost any weight without trying in the past 3 months?: No  Do you eat only one meal per day?: No  Have you seen the dentist within the past year?: Yes  Body mass index: 23.57  Health Habits/Nutrition Interventions:  · Inadequate physical activity:  educational materials provided to promote increased physical activity    Hearing/Vision:  No exam data present  Hearing/Vision  Do you or your family notice any trouble with your hearing that hasn't been managed with hearing aids?: (!) Yes  Do you have difficulty driving, watching TV, or doing any of your daily activities because of your eyesight?: No  Have you had an eye exam within the past year?: Yes  Hearing/Vision Interventions:  · has hearing aid      Personalized Preventive Plan   Current Health Maintenance Status  Immunization History   Administered Date(s) Administered    Pneumococcal Conjugate 13-valent ([id number]) 06/13/2016    Pneumococcal Polysaccharide ([id number]) 06/14/2017    Td, unspecified formulation 09/12/2011    Zoster Live (Zostavax) 09/12/2014        Health Maintenance   Topic Date Due    DTaP/Tdap/Td vaccine (1 - Tdap) 06/02/1951    Shingles Vaccine (2 of 3) 11/07/2014    Annual Wellness Visit (AWV)  05/29/2019    Flu vaccine (1) 02/15/2022 (Originally 9/1/2020)    COVID-19 Vaccine (2 of 2 - Moderna series) 02/24/2021    Lipid screen  08/25/2021    Potassium monitoring  08/25/2021    Creatinine monitoring  08/25/2021    Pneumococcal 65+ years Vaccine  Completed    Hepatitis A vaccine  Aged Out    Hepatitis B vaccine  Aged Out    Hib vaccine  Aged Out    Meningococcal (ACWY) vaccine  Aged Out     Recommendations for [employer] Due: see orders and patient instructions/AVS.  . Recommended screening schedule for the next 5-10 years is provided to the patient in written form: see Patient Instructions/AVS.    Sarah Evans was seen today for medicare awv. Diagnoses and all orders for this visit:    Routine general medical examination at a health care facility    Essential hypertension    Hyperlipidemia, unspecified hyperlipidemia type    Benign prostatic hyperplasia with weak urinary stream               Merlene Wilhelm is a 80 y.o. male being evaluated by a Virtual Visit (phone) encounter to address concerns as mentioned above. A caregiver was present when appropriate. Due to this being a TeleHealth encounter ([facility]), evaluation of the following organ systems was limited: Vitals/Constitutional/EENT/Resp/CV/GI//MS/Neuro/Skin/Heme-Lymph-Imm.   Pursuant to the emergency declaration under the [facility], 1135 waiver authority and the Coronavirus Preparedness and Response Supplemental Appropriations Act, this Virtual Visit was conducted with patient's (and/or legal guardian's) consent, to reduce the patient's risk of exposure to COVID-19 and provide necessary medical care. The patient (and/or legal guardian) has also been advised to contact this office for worsening conditions or problems, and seek emergency medical treatment and/or call 911 if deemed necessary. Patient identification was verified at the start of the visit: Yes    Services were provided through phone to substitute for in-person clinic visit. Patient and provider were located at their individual homes. --Ulices Bentley MD on 2/15/2021 at 9:40 AM    An electronic signature was used to authenticate this note.

## 2021-02-15 NOTE — PATIENT INSTRUCTIONS
Personalized Preventive Plan for Silvino Cantor - 2/15/2021  Medicare offers a range of preventive health benefits. Some of the tests and screenings are paid in full while other may be subject to a deductible, co-insurance, and/or copay. Some of these benefits include a comprehensive review of your medical history including lifestyle, illnesses that may run in your family, and various assessments and screenings as appropriate. After reviewing your medical record and screening and assessments performed today your provider may have ordered immunizations, labs, imaging, and/or referrals for you. A list of these orders (if applicable) as well as your Preventive Care list are included within your After Visit Summary for your review. Other Preventive Recommendations:    · A preventive eye exam performed by an eye specialist is recommended every 1-2 years to screen for glaucoma; cataracts, macular degeneration, and other eye disorders. · A preventive dental visit is recommended every 6 months. · Try to get at least 150 minutes of exercise per week or 10,000 steps per day on a pedometer . · Order or download the FREE \"Exercise & Physical Activity: Your Everyday Guide\" from [employer]. Call 1-112.968.5153 or search [employer] online. · You need 1174-1615 mg of calcium and 4487-9658 IU of vitamin D per day. It is possible to meet your calcium requirement with diet alone, but a vitamin D supplement is usually necessary to meet this goal.  · When exposed to the sun, use a sunscreen that protects against both UVA and UVB radiation with an SPF of 30 or greater. Reapply every 2 to 3 hours or after sweating, drying off with a towel, or swimming. · Always wear a seat belt when traveling in a car. Always wear a helmet when riding a bicycle or motorcycle.

## 2021-02-25 ENCOUNTER — APPOINTMENT (OUTPATIENT)
Dept: GENERAL RADIOLOGY | Age: 86
End: 2021-02-25
Payer: MEDICARE

## 2021-02-25 ENCOUNTER — HOSPITAL ENCOUNTER (EMERGENCY)
Age: 86
Discharge: HOME | End: 2021-02-25
Attending: EMERGENCY MEDICINE
Payer: MEDICARE

## 2021-02-25 ENCOUNTER — APPOINTMENT (OUTPATIENT)
Dept: CT IMAGING | Age: 86
End: 2021-02-25
Payer: MEDICARE

## 2021-02-25 VITALS
TEMPERATURE: 97.2 F | HEART RATE: 64 BPM | WEIGHT: 155 LBS | OXYGEN SATURATION: 99 % | RESPIRATION RATE: 20 BRPM | BODY MASS INDEX: 22.19 KG/M2 | HEIGHT: 70 IN | SYSTOLIC BLOOD PRESSURE: 118 MMHG | DIASTOLIC BLOOD PRESSURE: 60 MMHG
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
## 2021-02-25 DIAGNOSIS — S12.500A CLOSED DISPLACED FRACTURE OF SIXTH CERVICAL VERTEBRA, UNSPECIFIED FRACTURE MORPHOLOGY, INITIAL ENCOUNTER (HCC): ICD-10-CM

## 2021-02-25 DIAGNOSIS — M25.78 CERVICAL OSTEOPHYTE: ICD-10-CM

## 2021-02-25 LAB
ALBUMIN SERPL-MCNC: 3.8 G/DL (ref 3.5–4.6)
ALP BLD-CCNC: 100 U/L (ref 35–104)
ALT SERPL-CCNC: 24 U/L (ref 0–41)
ANION GAP SERPL CALCULATED.3IONS-SCNC: 10 MEQ/L (ref 9–15)
APTT: 26.4 SEC (ref 24.4–36.8)
AST SERPL-CCNC: 37 U/L (ref 0–40)
BASOPHILS ABSOLUTE: 0 K/UL (ref 0–0.2)
BASOPHILS RELATIVE PERCENT: 0.5 %
BILIRUB SERPL-MCNC: 0.5 MG/DL (ref 0.2–0.7)
BUN BLDV-MCNC: 35 MG/DL (ref 8–23)
CALCIUM SERPL-MCNC: 9.2 MG/DL (ref 8.5–9.9)
CHLORIDE BLD-SCNC: 101 MEQ/L (ref 95–107)
CO2: 25 MEQ/L (ref 20–31)
CREAT SERPL-MCNC: 1.24 MG/DL (ref 0.7–1.2)
EOSINOPHILS ABSOLUTE: 0.2 K/UL (ref 0–0.7)
EOSINOPHILS RELATIVE PERCENT: 1.8 %
GFR AFRICAN AMERICAN: >60
GFR NON-AFRICAN AMERICAN: 54.9
GLOBULIN: 2.9 G/DL (ref 2.3–3.5)
GLUCOSE BLD-MCNC: 150 MG/DL (ref 70–99)
HCT VFR BLD CALC: 38.6 % (ref 42–52)
HEMOGLOBIN: 13.3 G/DL (ref 14–18)
INR BLD: 1.1
LYMPHOCYTES ABSOLUTE: 1 K/UL (ref 1–4.8)
LYMPHOCYTES RELATIVE PERCENT: 9.9 %
MAGNESIUM: 1.8 MG/DL (ref 1.7–2.4)
MCH RBC QN AUTO: 33.7 PG (ref 27–31.3)
MCHC RBC AUTO-ENTMCNC: 34.3 % (ref 33–37)
MCV RBC AUTO: 98.1 FL (ref 80–100)
MONOCYTES ABSOLUTE: 0.7 K/UL (ref 0.2–0.8)
MONOCYTES RELATIVE PERCENT: 7.5 %
NEUTROPHILS ABSOLUTE: 7.8 K/UL (ref 1.4–6.5)
NEUTROPHILS RELATIVE PERCENT: 80.3 %
PDW BLD-RTO: 12.6 % (ref 11.5–14.5)
PLATELET # BLD: 180 K/UL (ref 130–400)
POTASSIUM SERPL-SCNC: 3.9 MEQ/L (ref 3.4–4.9)
PROTHROMBIN TIME: 13.9 SEC (ref 12.3–14.9)
RBC # BLD: 3.94 M/UL (ref 4.7–6.1)
SODIUM BLD-SCNC: 136 MEQ/L (ref 135–144)
TOTAL PROTEIN: 6.7 G/DL (ref 6.3–8)
WBC # BLD: 9.8 K/UL (ref 4.8–10.8)

## 2021-02-25 PROCEDURE — 70498 CT ANGIOGRAPHY NECK: CPT

## 2021-02-25 PROCEDURE — 36415 COLL VENOUS BLD VENIPUNCTURE: CPT

## 2021-02-25 PROCEDURE — 70450 CT HEAD/BRAIN W/O DYE: CPT

## 2021-02-25 PROCEDURE — 85025 COMPLETE CBC W/AUTO DIFF WBC: CPT

## 2021-02-25 PROCEDURE — 72040 X-RAY EXAM NECK SPINE 2-3 VW: CPT

## 2021-02-25 PROCEDURE — 99281 EMR DPT VST MAYX REQ PHY/QHP: CPT | Performed by: SURGERY

## 2021-02-25 PROCEDURE — 85730 THROMBOPLASTIN TIME PARTIAL: CPT

## 2021-02-25 PROCEDURE — 80053 COMPREHEN METABOLIC PANEL: CPT

## 2021-02-25 PROCEDURE — 83735 ASSAY OF MAGNESIUM: CPT

## 2021-02-25 PROCEDURE — 99285 EMERGENCY DEPT VISIT HI MDM: CPT

## 2021-02-25 PROCEDURE — 73030 X-RAY EXAM OF SHOULDER: CPT

## 2021-02-25 PROCEDURE — 6360000004 HC RX CONTRAST MEDICATION: Performed by: EMERGENCY MEDICINE

## 2021-02-25 PROCEDURE — 72125 CT NECK SPINE W/O DYE: CPT

## 2021-02-25 PROCEDURE — 85610 PROTHROMBIN TIME: CPT

## 2021-02-25 RX ORDER — OXYCODONE HYDROCHLORIDE AND ACETAMINOPHEN 5; 325 MG/1; MG/1
1 TABLET ORAL ONCE
Status: DISCONTINUED | OUTPATIENT
Start: 2021-02-25 | End: 2021-02-25 | Stop reason: HOSPADM

## 2021-02-25 RX ORDER — 0.9 % SODIUM CHLORIDE 0.9 %
1000 INTRAVENOUS SOLUTION INTRAVENOUS ONCE
Status: DISCONTINUED | OUTPATIENT
Start: 2021-02-25 | End: 2021-02-25 | Stop reason: HOSPADM

## 2021-02-25 RX ORDER — OXYCODONE HYDROCHLORIDE AND ACETAMINOPHEN 5; 325 MG/1; MG/1
1 TABLET ORAL EVERY 6 HOURS PRN
Qty: 12 TABLET | Refills: 0 | Status: SHIPPED | OUTPATIENT
Start: 2021-02-25 | End: 2021-02-28

## 2021-02-25 RX ADMIN — IOPAMIDOL 100 ML: 755 INJECTION, SOLUTION INTRAVENOUS at 11:54

## 2021-02-25 ASSESSMENT — PAIN DESCRIPTION - LOCATION: LOCATION: SHOULDER;NECK

## 2021-02-25 ASSESSMENT — ENCOUNTER SYMPTOMS
BACK PAIN: 0
COUGH: 0
NAUSEA: 0
ABDOMINAL PAIN: 0
SORE THROAT: 0
DIARRHEA: 0
VOMITING: 0
SHORTNESS OF BREATH: 0

## 2021-02-25 ASSESSMENT — PAIN SCALES - GENERAL: PAINLEVEL_OUTOF10: 4

## 2021-02-25 NOTE — CONSULTS
Trauma Consult / H & P Note    Reason for Consult: Trauma  Consulting Provider: Aye Spivey MD      BASIC INJURY INFORMATION:  Level of activation: CAT 2  Mode of transport: Hospital staff  Mechanism of injury: Fall from Standing  Complicating features: NA  Protective measures: NA    HISTORY OF PRESENT INJURY:   Travon Johnson is a 80 y.o. male with hx of HLD, HTN and BPH who presents to the ED s/p mechanical fall from standing. Pt was at the infusion clinic within the hospital when his wife tripped. Pt states he was able to catch her but then they both fell onto the floor. Pt is unsure of head injury, stating \"it happened so fast.\" He denies any loc. Pt states he did strike his left shoulder on the ground. Pt was assisted up from the floor by hospital staff and transported to the ED for evaluation. Pt does complain of slight left sided neck pain and posterior left shoulder pain. Pt states the shoulder pain is improving. He denies any back pain, chest pain, sob or abdominal pain. Pt denies any numbness/tingling or weakness. No other precipitating events, pain is worse with movement/better with rest. He has not attempted any medications or treatments. (?) Head strike, (-) loc, (+) AC/AP, (-) ETOH. ED work up remarkable for discontinuity at C5-6, concerning for acute fracture of the osteophytes. Trauma and NSGY were consulted for further evaluation.        PRIMARY SURVEY:  Airway: Intact  Breathing: Normal   Breath Sounds: Breath Sounds Equal Bilaterally  Circulation:    Pulses: Normal   Skin: Normal skin color, texture and turgor and No rashes or lesions  Disability:   Pupils: PERRL   GCS:    Best Eyes: 4    Best Verbal: 5    Best Motor: 6    Total: 15    Vitals:   Vitals:    02/25/21 0925 02/25/21 1024 02/25/21 1121 02/25/21 1221   BP:  132/74 124/68 118/60   Pulse:  68 71 64   Resp:  20 18 20   Temp:       SpO2:  99% 100% 99%   Weight: 155 lb (70.3 kg)      Height: 5' 10\" (1.778 m)            SECONDARY SURVEY:  Neurologic: Alert and Oriented, Appropriate, Moves all Extremities, Strength Symmetrical and No Sensory Deficits   HEENT:   Head: No lacerations, bony step-offs, or abrasions and Midface stable to palpation   Eyes: PERRL, Corneas/Conjunctiva without lesions and EOM intact   Ears: No Hemotympanum   Nose: Septum Midline, No crepitus with motion; and No bloody discharge; Throat: Oral cavity without trauma   Neck: No midline tenderness and No lacerations/wounds. Pain to palpation of left paracervical region. Pulmonary: External exam: no crepitus or pain with palpation, no contusions or abrasions; and Lung exam: breath sounds clear, no wheezes, no rales  Cardiovascular:    Pulses: Bilateral radial, femoral, DP and PT pulses are normal;  Abdomen: Appearance: Non-distended, No scars, lacerations, contusions; and Palpation: no tenderness   Rectal: Not performed  Pelvis/Perineum: Pelvis is stable to palpation;  Musculoskeletal:    Back/Spine: Thoracolumbar spinal column non-tender; no step off or deformity; Extremities: No gross upper or lower extremity signs of trauma; and Arm/shoulder L: Abnormal. Tender to palpation posterior aspect but with from, no laxity. PAST MEDICAL HISTORY:  Past Medical History:   Diagnosis Date    Hyperlipidemia     Hypertension, well controlled        PAST SURGICAL HISTORY:  Past Surgical History:   Procedure Laterality Date    CATARACT REMOVAL  5/2012    bilateral    COLONOSCOPY  7/23/2008       PRE-ADMISSION MEDICATIONS:   Prior to Admission medications    Medication Sig Start Date End Date Taking? Authorizing Provider   oxyCODONE-acetaminophen (PERCOCET) 5-325 MG per tablet Take 1 tablet by mouth every 6 hours as needed for Pain for up to 3 days. Intended supply: 3 days.  Take lowest dose possible to manage pain 2/25/21 2/28/21 Yes Yamileth Ga MD   alfuzosin Lysle Skye) 10 MG extended release tablet TAKE 1 TABLET DAILY 10/13/20   Andrew Mendez MD   [facility]) 5 MG tablet TAKE 1 TABLET DAILY 10/13/20   Fidelia Melton MD   lisinopril-hydroCHLOROthiazide PÉREZ [facility]) 10-12.5 MG per tablet TAKE 1 TABLET DAILY 4/27/20   Fidelia Melton MD   metoprolol succinate (TOPROL XL) 25 MG extended release tablet TAKE 1 TABLET DAILY 4/27/20   Fidelia Melton MD   simvastatin (ZOCOR) 20 MG tablet TAKE 1 TABLET NIGHTLY 4/27/20   Fidelia Melton MD   aspirin 81 MG EC tablet Take 81 mg by mouth daily    Historical Provider, MD       ALLERGIES:  Patient has no known allergies. SOCIAL HISTORY:   Social History     Socioeconomic History    Marital status:      Spouse name: None    Number of children: None    Years of education: None    Highest education level: None   Occupational History    None   Social Needs    Financial resource strain: Not hard at all   [employer] insecurity     Worry: Never true     Inability: Never true    Transportation needs     Medical: No     Non-medical: No   Tobacco Use    Smoking status: Former Smoker    Smokeless tobacco: Never Used   Substance and Sexual Activity    Alcohol use: None    Drug use: None    Sexual activity: None   Lifestyle    Physical activity     Days per week: None     Minutes per session: None    Stress: None   Relationships    Social connections     Talks on phone: None     Gets together: None     Attends Scientologist service: None     Active member of club or organization: None     Attends meetings of clubs or organizations: None     Relationship status: None    Intimate partner violence     Fear of current or ex partner: None     Emotionally abused: None     Physically abused: None     Forced sexual activity: None   Other Topics Concern    None   Social History Narrative    None       FAMILY HISTORY:  History reviewed. No pertinent family history.   Pt denies any family hx fo bleeding or clotting disorders      REVIEW OF SYSTEMS:  Constitutional: Negative for  weight loss  HENT: Negative for congestion, facial swelling and bloody nose  Eyes: Negative for  vision changes  Respiratory: Negative for shortness of breath, difficulty breathing  Cardiovascular: Negative for chest wall pain. Gastrointestinal: Negative for abdominal distention, abdominal pain and vomiting. Genitourinary: Negative for  hematuria  Musculoskeletal: Negative for gait difficulties   Skin: Negative for bruising, abrasions  Neurological: Negative for dizziness, weakness and light-headedness. Hematological: Negative for easy bruising/bleeding  Psychiatric/Behavioral: Negative for behavioral problems. Except as noted above the remainder of the review of systems was reviewed and negative. BASIC LABS:  CBC with Differential:    Lab Results   Component Value Date    WBC 9.8 02/25/2021    RBC 3.94 02/25/2021    HGB 13.3 02/25/2021    HCT 38.6 02/25/2021     02/25/2021    MCV 98.1 02/25/2021    MCH 33.7 02/25/2021    MCHC 34.3 02/25/2021    RDW 12.6 02/25/2021    LYMPHOPCT 9.9 02/25/2021    MONOPCT 7.5 02/25/2021    BASOPCT 0.5 02/25/2021    MONOSABS 0.7 02/25/2021    LYMPHSABS 1.0 02/25/2021    EOSABS 0.2 02/25/2021    BASOSABS 0.0 02/25/2021     CMP:   Lab Results   Component Value Date     02/25/2021    K 3.9 02/25/2021     02/25/2021    CO2 25 02/25/2021    BUN 35 (H) 02/25/2021    CREATININE 1.24 (H) 02/25/2021    GLUCOSE 150 (H) 02/25/2021    CALCIUM 9.2 02/25/2021    PROT 6.7 02/25/2021    LABALBU 3.8 02/25/2021    BILITOT 0.5 02/25/2021    ALKPHOS 100 02/25/2021    AST 37 02/25/2021    ALT 24 02/25/2021    LABGLOM 54.9 (L) 02/25/2021    GFRAA >60.0 02/25/2021    GLOB 2.9 02/25/2021     Magnesium:   Lab Results   Component Value Date    MG 1.8 02/25/2021     Troponin: No results found for: TROPONINI  PT/INR:   Recent Labs     02/25/21  1030   PROTIME 13.9   INR 1.1     APTT:   Recent Labs     02/25/21  1030   APTT 26.4     EtOH: No results found for: ETOH    RADIOLOGY:  CT HEAD: There are no acute intracranial changes.      CT attending trauma surgeon, Dr. Maria R Montaño MD      [address] [place] [address]   [phone] ([phone]) [phone]    Teaching Physician Note    I saw and evaluated the patient and reviewed all labs and imaging in the last 24 hours. I personally obtained the key and critical portions of the history and physical exam.  I reviewed the MO's documentation and discussed the patient with the MO. I agree with the MO's medical decision making as documented in the MO note. 80y male presenting after fall from standing. Was taking wife to infusion center and she tripped - he tried to catch her and they both fell - she fell on top of him. He complains of mild neck pain and left shoulder pain. No loss of conciousness. No thinners. On exam, GCS 15. No sensory or motor deficits. Mild tenderness to left shoulder with ROM. Says neck is just a little sore. Very eager to leave so he can get his wife home. Labs and imaging reviewed. Osteophyte fracture between C5-6. Spine Jordan Rodriguez) consulted and recommended c-collar and xrays. We also obtained CTA neck. CTA and shoulder xray negative. CT head negative. Jane Wong still concerning for osteophyte fracture. Per Khadijah Ross, pt ok to follow up in clinic. Pt's pain is well controlled. No indication for admission to or follow up with trauma.     Maria R Montaño MD  Trauma, Surgical Critical Care, and Emergency General Surgery

## 2021-02-25 NOTE — ED NOTES
Patient to ct via cart. Patient refused medication stating that he drove and the pain is getting better.         Kelle Jesus RN  02/25/21 2014

## 2021-02-25 NOTE — ED NOTES
Aspen collar placed on patient per Dr. Barb Ren. Patient requesting to not be admitted, because he doesn't have anyone to  his wife.       Sussy Butler RN  02/25/21 8273

## 2021-02-25 NOTE — ED PROVIDER NOTES
[address] [facility] ED  eMERGENCYdEPARTMENT eNCOUnter      Pt Name: Merlene Wilhelm  MRN: [id number]  [place] 6/2/1932  Date of evaluation: 2/25/2021  Julia Syed MD    CHIEF COMPLAINT           HPI  Merlene Wilhelm is a 80 y.o. male per chart review has a h/o HTN, Hpl presents to the ED s/p fall. Pt notes he was with his wife in the infusion center and she tripped and he tried to catch her and he fell. Pt notes moderate, constant, aching, L shoulder pain. Pt notes he hit his head however no LOC. Pt also notes neck pain. Pt denies fever, n/v, cp, sob, dysuria, diarrhea. ROS  Review of Systems   Constitutional: Negative for activity change, chills and fever. HENT: Negative for ear pain and sore throat. Eyes: Negative for visual disturbance. Respiratory: Negative for cough and shortness of breath. Cardiovascular: Negative for chest pain, palpitations and leg swelling. Gastrointestinal: Negative for abdominal pain, diarrhea, nausea and vomiting. Genitourinary: Negative for dysuria. Musculoskeletal: Positive for neck pain. Negative for back pain. L shoulder pain   Skin: Negative for rash. Neurological: Negative for dizziness and weakness. Except as noted above the remainder of the review of systems was reviewed and negative.        PAST MEDICAL HISTORY     Past Medical History:   Diagnosis Date    Hyperlipidemia     Hypertension, well controlled          SURGICAL HISTORY       Past Surgical History:   Procedure Laterality Date    CATARACT REMOVAL  5/2012    bilateral    COLONOSCOPY  7/23/2008         CURRENTMEDICATIONS       Previous Medications    ALFUZOSIN (UROXATRAL) 10 MG EXTENDED RELEASE TABLET    TAKE 1 TABLET DAILY    ASPIRIN 81 MG EC TABLET    Take 81 mg by mouth daily    FINASTERIDE (PROSCAR) 5 MG TABLET    TAKE 1 TABLET DAILY    LISINOPRIL-HYDROCHLOROTHIAZIDE (PRINZIDE;ZESTORETIC) 10-12.5 MG PER TABLET    TAKE 1 TABLET DAILY    METOPROLOL SUCCINATE Eyes:      Conjunctiva/sclera: Conjunctivae normal.      Pupils: Pupils are equal, round, and reactive to light. Neck:      Musculoskeletal: Normal range of motion and neck supple. Cardiovascular:      Rate and Rhythm: Normal rate and regular rhythm. Heart sounds: Normal heart sounds. Pulmonary:      Effort: Pulmonary effort is normal.      Breath sounds: Normal breath sounds. Abdominal:      General: Bowel sounds are normal. There is no distension. Palpations: Abdomen is soft. Tenderness: There is no abdominal tenderness. Musculoskeletal: Normal range of motion. Comments: +Tenderness to palpation in L shoulder. LUE neurovascularly intact. Skin:     General: Skin is warm and dry. Neurological:      Mental Status: He is alert and oriented to person, place, and time. Psychiatric:         Mood and Affect: Mood normal.           MDM  79 yo male presents to the ED s/p fall with L shoulder pain, neck pain. Pt is afebrile, hemodynamically stable. Pt refused pain meds in the ED. Labs remarkable for glucose 150, BUN 35, Cr 1.24, Hb 13.3. CT head negative. CT cspine shows subtle fx of osteophyte between C5/C6. Case discussed with Dr. Laly Eckert (nsgy) who recommended cervical XRs. Case discussed with Dr. Arelis Zeng (trauma) who recommended a CTA neck. CTA neck negative. Cervical XR shows subtle fx of osteophyte between C5/C6. Pt offered admission however wanted to go home. Case discussed with Dr. Laly Eckert and Dr. Arelis Zeng and we all believed pt was stable to go home in a c collar. Pt made a f/u appt with Dr. Laly Eckert. Pt given prescription for percocet, given fall, neck pain warning signs and will f/u with nsgy. Pt understands plan. FINAL IMPRESSION      1. Fall, initial encounter    2. Cervical osteophyte    3.  Closed displaced fracture of sixth cervical vertebra, unspecified fracture morphology, initial encounter [facility])          DISPOSITION/PLAN   DISPOSITION Decision To Discharge 02/25/2021 01:02:43 PM        DISCHARGE MEDICATIONS:  [unfilled]         Norris Jaimes MD(electronically signed)  Attending Emergency Physician            Norris Jaimes MD  02/25/21 8808

## 2021-02-25 NOTE — ED NOTES
Patient returned from ct/x-ray. No distress noted. Patient has no complaints. Registration at bedside.       Shellie Henderson RN  02/25/21 1010

## 2021-02-25 NOTE — ED TRIAGE NOTES
Patient arrived to ER from [facility] after losing balance and falling after helping his wife get out of car. Bruising noted to right side of neck. Patient A&OX4. Skin wnl. Neuro wnl. Weak left shoulder movement due to pain that patient stated was improving. No loc.

## 2021-03-03 ENCOUNTER — TELEPHONE (OUTPATIENT)
Dept: FAMILY MEDICINE CLINIC | Age: 86
End: 2021-03-03

## 2021-03-03 NOTE — TELEPHONE ENCOUNTER
Pt fell in the infusion center while his wife was having a treatment, please see notes from 2/25/21 ER Visit, Pt is calling asking if he can remove the neck brace for cleaning and shaving, advised to follow ER advise,  Which the pt stated was to not remove the neck brace until he see's his neurologist which is tomorrow, pt's phone number is [phone].

## 2021-03-04 ENCOUNTER — VIRTUAL VISIT (OUTPATIENT)
Dept: FAMILY MEDICINE CLINIC | Age: 86
End: 2021-03-04
Payer: MEDICARE

## 2021-03-04 DIAGNOSIS — R11.0 NAUSEA: ICD-10-CM

## 2021-03-04 DIAGNOSIS — G47.9 DISTURBANCE OF SLEEP: ICD-10-CM

## 2021-03-04 DIAGNOSIS — S12.491D OTHER CLOSED NONDISPLACED FRACTURE OF FIFTH CERVICAL VERTEBRA WITH ROUTINE HEALING, SUBSEQUENT ENCOUNTER: Primary | ICD-10-CM

## 2021-03-04 PROBLEM — S12.401A CLOSED NONDISPLACED FRACTURE OF FIFTH CERVICAL VERTEBRA (HCC): Status: ACTIVE | Noted: 2021-03-04

## 2021-03-04 PROCEDURE — 99422 OL DIG E/M SVC 11-20 MIN: CPT | Performed by: STUDENT IN AN ORGANIZED HEALTH CARE EDUCATION/TRAINING PROGRAM

## 2021-03-04 ASSESSMENT — ENCOUNTER SYMPTOMS
SINUS PRESSURE: 0
VOMITING: 0
SORE THROAT: 0
SHORTNESS OF BREATH: 0
COUGH: 0
ABDOMINAL PAIN: 0

## 2021-03-04 NOTE — TELEPHONE ENCOUNTER
Pt does have an appt with Dr. Fred Lee today and was told by his office it was ok to remove for showers and to shave

## 2021-03-04 NOTE — PROGRESS NOTES
This visit began at 5:10pm    Location of the visit was the [facility] primary care site. TELEHEALTH APPOINTMENT  Patient has been screened to determine that this visit qualifies for a \"Telephone Visit\". Patient is currently established with the current medical practice, the condition being reviewed was not addressed within the previous 7 days and is not likely be determined to need a procedure within the next 24 hours. This visit was via telephone due to the restrictions of the COVID-19 pandemic. All issues as below were discussed and addressed but no physical exam was performed. It was felt the patient should be evaluated in the clinic there will be comment below demonstrating they were directed there. The patient is aware and has given verbal consent to be billed for this telephone encounter. Patient Name: Delgado Mejia  MRN: [id number]    Chief Complaint   Patient presents with   [address] from [place] [address], 2/25/21. States he is wearing a neck brace and making it hard to sleep. Also experiencing nausea.      Nausea    Insomnia     HPI  Fall  Pt with anterior neck fracture at C5-6 (2/25)  Pt seen by Dr. Xochitl Miguel, neurosurg  Recommended pt keep head and neck as still as possible - collar  Local heat/ice and OTC medications can be used  Recheck in 6-8 weeks with repeat XR cervical spine    Having a hard time sleeping due to collar  Has been trying to sleep in a lounge chair  Pt also with nausea   Wanting something for sleep    PMH:  Current Outpatient Medications on File Prior to Visit   Medication Sig Dispense Refill    alfuzosin (UROXATRAL) 10 MG extended release tablet TAKE 1 TABLET DAILY 90 tablet 3    finasteride (PROSCAR) 5 MG tablet TAKE 1 TABLET DAILY 90 tablet 3    lisinopril-hydroCHLOROthiazide (PRINZIDE;ZESTORETIC) 10-12.5 MG per tablet TAKE 1 TABLET DAILY 90 tablet 3    metoprolol succinate (TOPROL XL) 25 MG extended release tablet TAKE 1 TABLET DAILY 90 tablet 3  simvastatin (ZOCOR) 20 MG tablet TAKE 1 TABLET NIGHTLY 90 tablet 3    aspirin 81 MG EC tablet Take 81 mg by mouth daily       No current facility-administered medications on file prior to visit. Past Medical History:   Diagnosis Date    Hyperlipidemia     Hypertension, well controlled      Past Surgical History:   Procedure Laterality Date    CATARACT REMOVAL  5/2012    bilateral    COLONOSCOPY  7/23/2008     Social History     Socioeconomic History    Marital status:      Spouse name: Not on file    Number of children: Not on file    Years of education: Not on file    Highest education level: Not on file   Occupational History    Not on file   Social Needs    Financial resource strain: Not hard at all   [employer] insecurity     Worry: Never true     Inability: Never true   [employer] needs     Medical: No     Non-medical: No   Tobacco Use    Smoking status: Former Smoker    Smokeless tobacco: Never Used   Substance and Sexual Activity    Alcohol use: Not on file    Drug use: Not on file    Sexual activity: Not on file   Lifestyle    Physical activity     Days per week: Not on file     Minutes per session: Not on file    Stress: Not on file   Relationships    Social connections     Talks on phone: Not on file     Gets together: Not on file     Attends Mormonism service: Not on file     Active member of club or organization: Not on file     Attends meetings of clubs or organizations: Not on file     Relationship status: Not on file    Intimate partner violence     Fear of current or ex partner: Not on file     Emotionally abused: Not on file     Physically abused: Not on file     Forced sexual activity: Not on file   Other Topics Concern    Not on file   Social History Narrative    Not on file     History reviewed. No pertinent family history. Allergies:  Patient has no known allergies. Review of Systems   Constitutional: Negative for chills and fever.    HENT: Negative for congestion, sinus pressure and sore throat. Respiratory: Negative for cough and shortness of breath. Cardiovascular: Negative for chest pain and palpitations. Gastrointestinal: Negative for abdominal pain and vomiting. Musculoskeletal: Positive for neck pain. Negative for arthralgias and myalgias. Skin: Negative for rash and wound. Neurological: Positive for numbness. Negative for speech difficulty and light-headedness. Psychiatric/Behavioral: Positive for sleep disturbance. Negative for suicidal ideas. The patient is not nervous/anxious. PHYSICAL EXAM/ RESULTS    There were no vitals taken for this visit.     Vitals signs: pt does not have the proper equipment to take all VS.    The physical is not performed due to the visit being a telephone counter as indicated due to the restrictions of the COVID-19 pandemic    Recent Results (from the past 2016 hour(s))   Comprehensive Metabolic Panel    Collection Time: 02/25/21 10:30 AM   Result Value Ref Range    Sodium 136 135 - 144 mEq/L    Potassium 3.9 3.4 - 4.9 mEq/L    Chloride 101 95 - 107 mEq/L    CO2 25 20 - 31 mEq/L    Anion Gap 10 9 - 15 mEq/L    Glucose 150 (H) 70 - 99 mg/dL    BUN 35 (H) 8 - 23 mg/dL    CREATININE 1.24 (H) 0.70 - 1.20 mg/dL    GFR Non-African American 54.9 (L) >60    GFR  >60.0 >60    Calcium 9.2 8.5 - 9.9 mg/dL    Total Protein 6.7 6.3 - 8.0 g/dL    Albumin 3.8 3.5 - 4.6 g/dL    Total Bilirubin 0.5 0.2 - 0.7 mg/dL    Alkaline Phosphatase 100 35 - 104 U/L    ALT 24 0 - 41 U/L    AST 37 0 - 40 U/L    Globulin 2.9 2.3 - 3.5 g/dL   CBC Auto Differential    Collection Time: 02/25/21 10:30 AM   Result Value Ref Range    WBC 9.8 4.8 - 10.8 K/uL    RBC 3.94 (L) 4.70 - 6.10 M/uL    Hemoglobin 13.3 (L) 14.0 - 18.0 g/dL    Hematocrit 38.6 (L) 42.0 - 52.0 %    MCV 98.1 80.0 - 100.0 fL    MCH 33.7 (H) 27.0 - 31.3 pg    MCHC 34.3 33.0 - 37.0 %    RDW 12.6 11.5 - 14.5 %    Platelets 770 288 - 861 K/uL    Neutrophils %

## 2021-04-15 ENCOUNTER — HOSPITAL ENCOUNTER (OUTPATIENT)
Dept: GENERAL RADIOLOGY | Age: 86
Discharge: HOME | End: 2021-04-17
Payer: MEDICARE

## 2021-04-15 DIAGNOSIS — S12.491A OTHER CLOSED NONDISPLACED FRACTURE OF FIFTH CERVICAL VERTEBRA, INITIAL ENCOUNTER (HCC): ICD-10-CM

## 2021-04-15 PROCEDURE — 72040 X-RAY EXAM NECK SPINE 2-3 VW: CPT

## 2021-04-20 DIAGNOSIS — E78.5 HYPERLIPIDEMIA, UNSPECIFIED HYPERLIPIDEMIA TYPE: ICD-10-CM

## 2021-04-20 DIAGNOSIS — I10 ESSENTIAL HYPERTENSION: ICD-10-CM

## 2021-04-20 RX ORDER — METOPROLOL SUCCINATE 25 MG/1
TABLET, EXTENDED RELEASE ORAL
Qty: 90 TABLET | Refills: 3 | Status: SHIPPED | OUTPATIENT
Start: 2021-04-20 | End: 2022-04-15

## 2021-04-20 RX ORDER — SIMVASTATIN 20 MG
TABLET ORAL
Qty: 90 TABLET | Refills: 3 | Status: SHIPPED | OUTPATIENT
Start: 2021-04-20 | End: 2022-04-15

## 2021-04-20 RX ORDER — LISINOPRIL AND HYDROCHLOROTHIAZIDE 12.5; 1 MG/1; MG/1
TABLET ORAL
Qty: 90 TABLET | Refills: 3 | Status: SHIPPED | OUTPATIENT
Start: 2021-04-20 | End: 2022-02-15 | Stop reason: SDUPTHER

## 2021-08-17 ENCOUNTER — OFFICE VISIT (OUTPATIENT)
Dept: FAMILY MEDICINE CLINIC | Age: 86
End: 2021-08-17
Payer: MEDICARE

## 2021-08-17 VITALS
WEIGHT: 154.2 LBS | TEMPERATURE: 96.4 F | HEART RATE: 89 BPM | HEIGHT: 70 IN | OXYGEN SATURATION: 98 % | BODY MASS INDEX: 22.07 KG/M2 | SYSTOLIC BLOOD PRESSURE: 122 MMHG | DIASTOLIC BLOOD PRESSURE: 68 MMHG

## 2021-08-17 DIAGNOSIS — R73.9 HYPERGLYCEMIA: ICD-10-CM

## 2021-08-17 DIAGNOSIS — E78.5 HYPERLIPIDEMIA, UNSPECIFIED HYPERLIPIDEMIA TYPE: ICD-10-CM

## 2021-08-17 DIAGNOSIS — I10 ESSENTIAL HYPERTENSION: Primary | ICD-10-CM

## 2021-08-17 PROCEDURE — 99213 OFFICE O/P EST LOW 20 MIN: CPT | Performed by: FAMILY MEDICINE

## 2021-08-17 NOTE — PROGRESS NOTES
Laterality Date    CATARACT REMOVAL  5/2012    bilateral    COLONOSCOPY  7/23/2008     History reviewed. No pertinent family history. Social History     Socioeconomic History    Marital status:      Spouse name: None    Number of children: None    Years of education: None    Highest education level: None   Occupational History    None   Tobacco Use    Smoking status: Former Smoker    Smokeless tobacco: Never Used   Substance and Sexual Activity    Alcohol use: None    Drug use: None    Sexual activity: None   Other Topics Concern    None   Social History Narrative    None     Social Determinants of Health     Financial Resource Strain: Low Risk     Difficulty of Paying Living Expenses: Not hard at all   Food Insecurity: No Food Insecurity    Worried About Running Out of Food in the Last Year: Never true    Tesha of Food in the Last Year: Never true   Transportation Needs: No Transportation Needs    Lack of Transportation (Medical): No    Lack of Transportation (Non-Medical):  No   Physical Activity:     Days of Exercise per Week:     Minutes of Exercise per Session:    Stress:     Feeling of Stress :    Social Connections:     Frequency of Communication with Friends and Family:     Frequency of Social Gatherings with Friends and Family:     Attends Yazdanism Services:     Active Member of Clubs or Organizations:     Attends Club or Organization Meetings:     Marital Status:    Intimate Partner Violence:     Fear of Current or Ex-Partner:     Emotionally Abused:     Physically Abused:     Sexually Abused:      Current Outpatient Medications   Medication Sig Dispense Refill    simvastatin (ZOCOR) 20 MG tablet TAKE 1 TABLET NIGHTLY 90 tablet 3    lisinopril-hydroCHLOROthiazide (PRINZIDE;ZESTORETIC) 10-12.5 MG per tablet TAKE 1 TABLET DAILY 90 tablet 3    metoprolol succinate (TOPROL XL) 25 MG extended release tablet TAKE 1 TABLET DAILY 90 tablet 3    alfuzosin (UROXATRAL) 10 MG extended release tablet TAKE 1 TABLET DAILY 90 tablet 3    finasteride (PROSCAR) 5 MG tablet TAKE 1 TABLET DAILY 90 tablet 3    aspirin 81 MG EC tablet Take 81 mg by mouth daily       No current facility-administered medications for this visit. No Known Allergies    Review of Systems:   General ROS: some fatigue negative for - chills,fever, malaise, weight gain or weight loss  Respiratory ROS: no cough, shortness of breath, or wheezing  Cardiovascular ROS: no chest pain or dyspnea on exertion  Gastrointestinal ROS: no abdominal pain, change in bowel habits, or black or bloody stools  Genito-Urinary ROS: no dysuria, trouble voiding, hx BPH  Musculoskeletal ROS: left leg pain  Neurological ROS: tingling right side of face intermittently   Psych: mood ok  In general patient otherwise reports feeling well. Physical Exam:  /68 (Site: Left Upper Arm)   Pulse 89   Temp 96.4 °F (35.8 °C)   Ht 5' 10\" (1.778 m)   Wt 154 lb 3.2 oz (69.9 kg)   SpO2 98%   BMI 22.13 kg/m²     Gen: Well, NAD, Alert, Oriented x 3   HEENT: EOMI, eyes clear, MMM  Skin: without rash or jaundice,   Neck: no significant lymphadenopathy or thyromegaly  Lungs: CTA B w/out Rales/Wheezes/Rhonchi, Good respiratory effort   Heart: RRR, S1S2, w/out M/R/G, non-displaced PMI   Ext: No C/C/E Bilaterally. Neuro: Neurovascularly intact w/ Sensory/Motor intact UE/LE Bilaterally. Psych: euthymic    Lab Results   Component Value Date    WBC 9.8 02/25/2021    HGB 13.3 (L) 02/25/2021    HCT 38.6 (L) 02/25/2021     02/25/2021    CHOL 103 08/25/2020    TRIG 66 08/25/2020    HDL 43 08/25/2020    ALT 24 02/25/2021    AST 37 02/25/2021     02/25/2021    K 3.9 02/25/2021     02/25/2021    CREATININE 1.24 (H) 02/25/2021    BUN 35 (H) 02/25/2021    CO2 25 02/25/2021    TSH 2.680 06/24/2015    INR 1.1 02/25/2021           A&P   Diagnosis Orders   1. Essential hypertension  Lipid Panel    Comprehensive Metabolic Panel    CBC   2. Hyperlipidemia, unspecified hyperlipidemia type     3. Hyperglycemia  Hemoglobin A1C     Chronic issues well controlled  No changes  No major issues     Patient Counseling:  --Nutrition: Stressed importance of moderation in sodium/caffeine intake, saturated fat and cholesterol, caloric balance, sufficient intake of fresh fruits, vegetables, fiber-  --Exercise: Stressed the importance of regular exercise. --Dental health: Discussed importance of regulardental visits.   --Immunizations reviewed - UTD  --Discussed benefits of screening colonoscopy - utd              Labs as ordered    Has living will       Jonh Ch MD

## 2021-10-08 RX ORDER — ALFUZOSIN HYDROCHLORIDE 10 MG/1
TABLET, EXTENDED RELEASE ORAL
Qty: 90 TABLET | Refills: 3 | Status: SHIPPED | OUTPATIENT
Start: 2021-10-08 | End: 2022-10-03

## 2021-10-08 RX ORDER — FINASTERIDE 5 MG/1
TABLET, FILM COATED ORAL
Qty: 90 TABLET | Refills: 3 | Status: SHIPPED | OUTPATIENT
Start: 2021-10-08 | End: 2022-10-03

## 2021-10-08 NOTE — TELEPHONE ENCOUNTER
Future Appointments     Provider   2/15/2022 Vasyl Felix MD   Department: [facility]   Appt Notes: Return in about 6 months (around 2/17/2022). Recent Visits    08/17/2021 Essential hypertension   MD Susan   04/20/2021 Other closed nondisplaced fracture of fifth cervical vertebra, initial encounter ([facility] [address]   Fifi Bocanegra.  Geovanny Hull MD   03/04/2021 Other closed nondisplaced fracture of fifth cervical vertebra with routine healing, subsequent encounter   [address], DO

## 2021-12-29 ENCOUNTER — NURSE TRIAGE (OUTPATIENT)
Dept: OTHER | Facility: CLINIC | Age: 86
End: 2021-12-29

## 2021-12-29 NOTE — TELEPHONE ENCOUNTER
Received call from Anna Shepherd at [facility] with Red Flag Complaint. Subjective: Caller states \"My bp was high last Thursday. It was as high as 180. \"     Current Symptoms: high bp on thurs , this morning 146/71  On Prinizide 10/12.5mg 1 tab daily  Toprol 25mg po daily  Denies missing dose  Takes BP twice a day with home bp monitor    Onset: 7 days ago; gradual    Associated Symptoms: lightheaded    Pain Severity: 0/10; N/A; none    Temperature: NA by unknown method    What has been tried: nothing    LMP: NA Pregnant: NA    Recommended disposition: to see PCP in 2 wks    Advised to utilize the ED/UCC if condition worsens    Care advice provided, patient verbalizes understanding; denies any other questions or concerns; instructed to call back for any new or worsening symptoms. Writer provided warm transfer to [facility] at [facility] for appointment scheduling     Attention Provider: Thank you for allowing me to participate in the care of your patient. The patient was connected to triage in response to information provided to the ECC/PSC. Please do not respond through this encounter as the response is not directed to a shared pool.         Reason for Disposition   Systolic BP >= 932 OR Diastolic >= 80, and is taking BP medications    Protocols used: BLOOD PRESSURE - HIGH-ADULT-OH

## 2022-01-11 ENCOUNTER — OFFICE VISIT (OUTPATIENT)
Dept: FAMILY MEDICINE CLINIC | Age: 87
End: 2022-01-11
Payer: MEDICARE

## 2022-01-11 VITALS
SYSTOLIC BLOOD PRESSURE: 130 MMHG | TEMPERATURE: 96.5 F | HEART RATE: 65 BPM | WEIGHT: 154 LBS | DIASTOLIC BLOOD PRESSURE: 80 MMHG | HEIGHT: 70 IN | OXYGEN SATURATION: 98 % | BODY MASS INDEX: 22.05 KG/M2

## 2022-01-11 DIAGNOSIS — Z63.6 CAREGIVER STRESS: ICD-10-CM

## 2022-01-11 DIAGNOSIS — E78.5 HYPERLIPIDEMIA, UNSPECIFIED HYPERLIPIDEMIA TYPE: ICD-10-CM

## 2022-01-11 DIAGNOSIS — I10 ESSENTIAL HYPERTENSION: Primary | ICD-10-CM

## 2022-01-11 DIAGNOSIS — R73.9 HYPERGLYCEMIA: ICD-10-CM

## 2022-01-11 PROCEDURE — 99213 OFFICE O/P EST LOW 20 MIN: CPT | Performed by: FAMILY MEDICINE

## 2022-01-11 SDOH — SOCIAL STABILITY - SOCIAL INSECURITY: DEPENDENT RELATIVE NEEDING CARE AT HOME: Z63.6

## 2022-01-11 SDOH — ECONOMIC STABILITY: FOOD INSECURITY: WITHIN THE PAST 12 MONTHS, YOU WORRIED THAT YOUR FOOD WOULD RUN OUT BEFORE YOU GOT MONEY TO BUY MORE.: NEVER TRUE

## 2022-01-11 SDOH — ECONOMIC STABILITY: FOOD INSECURITY: WITHIN THE PAST 12 MONTHS, THE FOOD YOU BOUGHT JUST DIDN'T LAST AND YOU DIDN'T HAVE MONEY TO GET MORE.: NEVER TRUE

## 2022-01-11 ASSESSMENT — SOCIAL DETERMINANTS OF HEALTH (SDOH): HOW HARD IS IT FOR YOU TO PAY FOR THE VERY BASICS LIKE FOOD, HOUSING, MEDICAL CARE, AND HEATING?: NOT HARD AT ALL

## 2022-01-11 NOTE — PROGRESS NOTES
Chief Complaint   Patient presents with    Hypertension     check up, going up and down       HPI:  Michelle Andre is a 80 y.o. male    Follow up on bp   Has been labile recently     He does take his BP meds at different times      Patient Active Problem List   Diagnosis    Hyperlipidemia    Hypertension    BPH (benign prostatic hyperplasia)    Closed nondisplaced fracture of fifth cervical vertebra [facility])       Wife having ongoing issues with neurologic disease  Unable to walk, speech/balance/vision issues     He is main caregiver  Stressful     Urologist - Dr. Miguelangel Mon    Treatment Adherence:   Medication compliance:  compliant all of the time  Diet compliance:  compliant all of the time  Weight trend: stable  Current exercise: mowing lawn, golfing, caring for wife   Barriers: none    Hypertension:  Home blood pressure monitoring: Yes - good. He is adherent to a low sodium diet. Patient denies chest pain, shortness of breath, headache, blurred vision, peripheral edema, palpitations, dry cough and fatigue. Antihypertensive medication side effects: no medication side effects noted and orthostatic lightheadedness. Use of agents associated with hypertension: none. Sodium (mEq/L)   Date Value   08/20/2021 141    BUN (mg/dL)   Date Value   08/20/2021 29 (H)    Glucose (mg/dL)   Date Value   08/20/2021 109 (H)      Potassium (mEq/L)   Date Value   08/20/2021 4.7    CREATININE (mg/dL)   Date Value   08/20/2021 1.13         Hyperlipidemia:  No new myalgias or GI upset on simvastatin (Zocor).      Lab Results   Component Value Date    CHOL 121 08/20/2021    TRIG 92 08/20/2021    HDL 46 08/20/2021    LDLCALC 57 08/20/2021     Lab Results   Component Value Date    ALT 21 08/20/2021    AST 31 08/20/2021          BPH:  Doing ok on proscar and uroxatral      Past Medical History:   Diagnosis Date    Hyperlipidemia     Hypertension, well controlled      Past Surgical History: Procedure Laterality Date    CATARACT REMOVAL  5/2012    bilateral    COLONOSCOPY  7/23/2008     History reviewed. No pertinent family history. Social History     Socioeconomic History    Marital status:      Spouse name: None    Number of children: None    Years of education: None    Highest education level: None   Occupational History    None   Tobacco Use    Smoking status: Former Smoker    Smokeless tobacco: Never Used   Substance and Sexual Activity    Alcohol use: None    Drug use: None    Sexual activity: None   Other Topics Concern    None   Social History Narrative    None     Social Determinants of Health     Financial Resource Strain: Low Risk     Difficulty of Paying Living Expenses: Not hard at all   Food Insecurity: No Food Insecurity    Worried About Running Out of Food in the Last Year: Never true    Tesha of Food in the Last Year: Never true   Transportation Needs: No Transportation Needs    Lack of Transportation (Medical): No    Lack of Transportation (Non-Medical):  No   Physical Activity:     Days of Exercise per Week: Not on file    Minutes of Exercise per Session: Not on file   Stress:     Feeling of Stress : Not on file   Social Connections:     Frequency of Communication with Friends and Family: Not on file    Frequency of Social Gatherings with Friends and Family: Not on file    Attends Christian Services: Not on file    Active Member of Clubs or Organizations: Not on file    Attends Club or Organization Meetings: Not on file    Marital Status: Not on file   Intimate Partner Violence:     Fear of Current or Ex-Partner: Not on file    Emotionally Abused: Not on file    Physically Abused: Not on file    Sexually Abused: Not on file   Housing Stability:     Unable to Pay for Housing in the Last Year: Not on file    Number of [place] in the Last Year: Not on file    Unstable Housing in the Last Year: Not on file     Current Outpatient Medications   Medication Sig Dispense Refill    finasteride (PROSCAR) 5 MG tablet TAKE 1 TABLET DAILY 90 tablet 3    alfuzosin (UROXATRAL) 10 MG extended release tablet TAKE 1 TABLET DAILY 90 tablet 3    simvastatin (ZOCOR) 20 MG tablet TAKE 1 TABLET NIGHTLY 90 tablet 3    lisinopril-hydroCHLOROthiazide (PRINZIDE;ZESTORETIC) 10-12.5 MG per tablet TAKE 1 TABLET DAILY 90 tablet 3    metoprolol succinate (TOPROL XL) 25 MG extended release tablet TAKE 1 TABLET DAILY 90 tablet 3    aspirin 81 MG EC tablet Take 81 mg by mouth daily       No current facility-administered medications for this visit. No Known Allergies    Review of Systems:   General ROS: some fatigue negative for - chills,fever, malaise, weight gain or weight loss  Respiratory ROS: no cough, shortness of breath, or wheezing  Cardiovascular ROS: no chest pain or dyspnea on exertion  Gastrointestinal ROS: no abdominal pain, change in bowel habits, or black or bloody stools  Genito-Urinary ROS: no dysuria, trouble voiding, hx BPH  Musculoskeletal ROS: left leg pain  Neurological ROS: tingling right side of face intermittently   Psych: mood ok  In general patient otherwise reports feeling well. Physical Exam:  /80 (Site: Left Upper Arm)   Pulse 65   Temp 96.5 °F (35.8 °C)   Ht 5' 10\" (1.778 m)   Wt 154 lb (69.9 kg)   SpO2 98%   BMI 22.10 kg/m²     Gen: Well, NAD, Alert, Oriented x 3   HEENT: EOMI, eyes clear, MMM  Skin: without rash or jaundice,   Neck: no significant lymphadenopathy or thyromegaly  Lungs: CTA B w/out Rales/Wheezes/Rhonchi, Good respiratory effort   Heart: RRR, S1S2, w/out M/R/G, non-displaced PMI   Ext: No C/C/E Bilaterally. Neuro: Neurovascularly intact w/ Sensory/Motor intact UE/LE Bilaterally.   Psych: euthymic    Lab Results   Component Value Date    WBC 5.5 08/20/2021    HGB 13.5 (L) 08/20/2021    HCT 40.2 (L) 08/20/2021     08/20/2021    CHOL 121 08/20/2021    TRIG 92 08/20/2021    HDL 46 08/20/2021 ALT 21 08/20/2021    AST 31 08/20/2021     08/20/2021    K 4.7 08/20/2021     08/20/2021    CREATININE 1.13 08/20/2021    BUN 29 (H) 08/20/2021    CO2 26 08/20/2021    TSH 2.680 06/24/2015    INR 1.1 02/25/2021    LABA1C 5.8 08/20/2021           A&P   Diagnosis Orders   1. Essential hypertension     2. Hyperlipidemia, unspecified hyperlipidemia type     3. Hyperglycemia     4.  Caregiver stress          Return in 1 month for routine appt    Continue to monitor bp at home    Caregiver Stress playing a role     Will plan to do labs         Bennie Nowak MD

## 2022-02-15 ENCOUNTER — OFFICE VISIT (OUTPATIENT)
Dept: FAMILY MEDICINE CLINIC | Age: 87
End: 2022-02-15
Payer: MEDICARE

## 2022-02-15 VITALS
TEMPERATURE: 96.3 F | SYSTOLIC BLOOD PRESSURE: 138 MMHG | OXYGEN SATURATION: 97 % | HEART RATE: 57 BPM | DIASTOLIC BLOOD PRESSURE: 88 MMHG | BODY MASS INDEX: 21.9 KG/M2 | WEIGHT: 153 LBS | HEIGHT: 70 IN

## 2022-02-15 DIAGNOSIS — I10 ESSENTIAL HYPERTENSION: Primary | ICD-10-CM

## 2022-02-15 DIAGNOSIS — I10 ESSENTIAL HYPERTENSION: ICD-10-CM

## 2022-02-15 DIAGNOSIS — R73.9 HYPERGLYCEMIA: ICD-10-CM

## 2022-02-15 DIAGNOSIS — E78.5 HYPERLIPIDEMIA, UNSPECIFIED HYPERLIPIDEMIA TYPE: ICD-10-CM

## 2022-02-15 DIAGNOSIS — Z63.6 CAREGIVER STRESS: ICD-10-CM

## 2022-02-15 LAB
ALBUMIN SERPL-MCNC: 4 G/DL (ref 3.5–4.6)
ALP BLD-CCNC: 90 U/L (ref 35–104)
ALT SERPL-CCNC: 18 U/L (ref 0–41)
ANION GAP SERPL CALCULATED.3IONS-SCNC: 12 MEQ/L (ref 9–15)
AST SERPL-CCNC: 27 U/L (ref 0–40)
BILIRUB SERPL-MCNC: 0.7 MG/DL (ref 0.2–0.7)
BUN BLDV-MCNC: 31 MG/DL (ref 8–23)
CALCIUM SERPL-MCNC: 9.3 MG/DL (ref 8.5–9.9)
CHLORIDE BLD-SCNC: 104 MEQ/L (ref 95–107)
CHOLESTEROL, TOTAL: 117 MG/DL (ref 0–199)
CO2: 26 MEQ/L (ref 20–31)
CREAT SERPL-MCNC: 1.02 MG/DL (ref 0.7–1.2)
GFR AFRICAN AMERICAN: >60
GFR NON-AFRICAN AMERICAN: >60
GLOBULIN: 2.9 G/DL (ref 2.3–3.5)
GLUCOSE BLD-MCNC: 108 MG/DL (ref 70–99)
HBA1C MFR BLD: 5.9 % (ref 4.8–5.9)
HCT VFR BLD CALC: 38.8 % (ref 42–52)
HDLC SERPL-MCNC: 43 MG/DL (ref 40–59)
HEMOGLOBIN: 12.9 G/DL (ref 14–18)
LDL CHOLESTEROL CALCULATED: 59 MG/DL (ref 0–129)
MCH RBC QN AUTO: 32.8 PG (ref 27–31.3)
MCHC RBC AUTO-ENTMCNC: 33.4 % (ref 33–37)
MCV RBC AUTO: 98.3 FL (ref 80–100)
PDW BLD-RTO: 12.6 % (ref 11.5–14.5)
PLATELET # BLD: 160 K/UL (ref 130–400)
POTASSIUM SERPL-SCNC: 4.7 MEQ/L (ref 3.4–4.9)
RBC # BLD: 3.95 M/UL (ref 4.7–6.1)
SODIUM BLD-SCNC: 142 MEQ/L (ref 135–144)
TOTAL PROTEIN: 6.9 G/DL (ref 6.3–8)
TRIGL SERPL-MCNC: 77 MG/DL (ref 0–150)
WBC # BLD: 5.7 K/UL (ref 4.8–10.8)

## 2022-02-15 PROCEDURE — 99214 OFFICE O/P EST MOD 30 MIN: CPT | Performed by: FAMILY MEDICINE

## 2022-02-15 RX ORDER — LISINOPRIL AND HYDROCHLOROTHIAZIDE 12.5; 1 MG/1; MG/1
1 TABLET ORAL 2 TIMES DAILY
Qty: 180 TABLET | Refills: 3 | Status: SHIPPED | OUTPATIENT
Start: 2022-02-15

## 2022-02-15 SDOH — SOCIAL STABILITY - SOCIAL INSECURITY: DEPENDENT RELATIVE NEEDING CARE AT HOME: Z63.6

## 2022-02-15 NOTE — PROGRESS NOTES
Chief Complaint   Patient presents with    Hypertension     6 month        HPI:  Barbara Gaming is a 80 y.o. male    Follow up  Home bp numbers better than reading here today  More often under 140/90, but labile         Patient Active Problem List   Diagnosis    Hyperlipidemia    Hypertension    BPH (benign prostatic hyperplasia)    Closed nondisplaced fracture of fifth cervical vertebra [facility])       Wife having ongoing issues with neurologic disease  Unable to walk, speech/balance/vision issues     He is main caregiver  Stressful     Urologist - Dr. Todd Carter    Treatment Adherence:   Medication compliance:  compliant all of the time  Diet compliance:  compliant all of the time  Weight trend: stable  Current exercise: mowing lawn, golfing, caring for wife   Barriers: none    Hypertension:  Home blood pressure monitoring: Yes - good. He is adherent to a low sodium diet. Patient denies chest pain, shortness of breath, headache, blurred vision, peripheral edema, palpitations, dry cough and fatigue. Antihypertensive medication side effects: no medication side effects noted and orthostatic lightheadedness. Use of agents associated with hypertension: none. Sodium (mEq/L)   Date Value   08/20/2021 141    BUN (mg/dL)   Date Value   08/20/2021 29 (H)    Glucose (mg/dL)   Date Value   08/20/2021 109 (H)      Potassium (mEq/L)   Date Value   08/20/2021 4.7    CREATININE (mg/dL)   Date Value   08/20/2021 1.13         Hyperlipidemia:  No new myalgias or GI upset on simvastatin (Zocor).      Lab Results   Component Value Date    CHOL 121 08/20/2021    TRIG 92 08/20/2021    HDL 46 08/20/2021    LDLCALC 57 08/20/2021     Lab Results   Component Value Date    ALT 21 08/20/2021    AST 31 08/20/2021          BPH:  Doing ok on proscar and uroxatral      Past Medical History:   Diagnosis Date    Hyperlipidemia     Hypertension, well controlled      Past Surgical History:   Procedure Laterality Date    CATARACT REMOVAL  5/2012    bilateral    COLONOSCOPY  7/23/2008     History reviewed. No pertinent family history. Social History     Socioeconomic History    Marital status:      Spouse name: None    Number of children: None    Years of education: None    Highest education level: None   Occupational History    None   Tobacco Use    Smoking status: Former Smoker    Smokeless tobacco: Never Used   Substance and Sexual Activity    Alcohol use: None    Drug use: None    Sexual activity: None   Other Topics Concern    None   Social History Narrative    None     Social Determinants of Health     Financial Resource Strain: Low Risk     Difficulty of Paying Living Expenses: Not hard at all   Food Insecurity: No Food Insecurity    Worried About Running Out of Food in the Last Year: Never true    Tesha of Food in the Last Year: Never true   Transportation Needs: No Transportation Needs    Lack of Transportation (Medical): No    Lack of Transportation (Non-Medical):  No   Physical Activity:     Days of Exercise per Week: Not on file    Minutes of Exercise per Session: Not on file   Stress:     Feeling of Stress : Not on file   Social Connections:     Frequency of Communication with Friends and Family: Not on file    Frequency of Social Gatherings with Friends and Family: Not on file    Attends Denominational Services: Not on file    Active Member of [address] [place] [address] or Organizations: Not on file    Attends Club or Organization Meetings: Not on file    Marital Status: Not on file   Intimate Partner Violence:     Fear of Current or Ex-Partner: Not on file    Emotionally Abused: Not on file    Physically Abused: Not on file    Sexually Abused: Not on file   Housing Stability:     Unable to Pay for Housing in the Last Year: Not on file    Number of [place] in the Last Year: Not on file    Unstable Housing in the Last Year: Not on file     Current Outpatient Medications AST 31 08/20/2021     08/20/2021    K 4.7 08/20/2021     08/20/2021    CREATININE 1.13 08/20/2021    BUN 29 (H) 08/20/2021    CO2 26 08/20/2021    TSH 2.680 06/24/2015    INR 1.1 02/25/2021    LABA1C 5.8 08/20/2021           A&P   Diagnosis Orders   1. Essential hypertension  Comprehensive Metabolic Panel    CBC    Lipid Panel    lisinopril-hydroCHLOROthiazide (PRINZIDE;ZESTORETIC) 10-12.5 MG per tablet   2. Hyperglycemia  Hemoglobin A1C   3. Caregiver stress     4.  Hyperlipidemia, unspecified hyperlipidemia type          Prinzide to BID     Continue to monitor bp at home    Caregiver Stress ongoing    Fasting labs    Because he fasted this morning, he did not take either of his bp meds    Moderate MDM    Tatianna Logan MD

## 2022-02-22 ENCOUNTER — TELEMEDICINE (OUTPATIENT)
Dept: FAMILY MEDICINE CLINIC | Age: 87
End: 2022-02-22
Payer: MEDICARE

## 2022-02-22 DIAGNOSIS — Z00.00 MEDICARE ANNUAL WELLNESS VISIT, SUBSEQUENT: Primary | ICD-10-CM

## 2022-02-22 PROCEDURE — G0439 PPPS, SUBSEQ VISIT: HCPCS

## 2022-02-22 ASSESSMENT — PATIENT HEALTH QUESTIONNAIRE - PHQ9
SUM OF ALL RESPONSES TO PHQ QUESTIONS 1-9: 0
1. LITTLE INTEREST OR PLEASURE IN DOING THINGS: 0
2. FEELING DOWN, DEPRESSED OR HOPELESS: 0
SUM OF ALL RESPONSES TO PHQ QUESTIONS 1-9: 0
SUM OF ALL RESPONSES TO PHQ9 QUESTIONS 1 & 2: 0
SUM OF ALL RESPONSES TO PHQ QUESTIONS 1-9: 0
SUM OF ALL RESPONSES TO PHQ QUESTIONS 1-9: 0

## 2022-02-22 ASSESSMENT — LIFESTYLE VARIABLES: HOW OFTEN DO YOU HAVE A DRINK CONTAINING ALCOHOL: NEVER

## 2022-02-22 NOTE — PROGRESS NOTES
Medicare Annual Wellness Visit    Barbara Gaming is here for Medicare AWV    Assessment & Plan   There are no diagnoses linked to this encounter. Recommendations for Preventive Services Due: see orders and patient instructions/AVS.  Recommended screening schedule for the next 5-10 years is provided to the patient in written form: see Patient Instructions/AVS.     No follow-ups on file. Reviewed and updated this visit by clinical staff: Allergies       Subjective   The following acute and/or chronic problems were also addressed today:  NA    Patient's complete Health Risk Assessment and screening values have been reviewed and are found in Flowsheets. The following problems were reviewed today and where indicated follow up appointments were made and/or referrals ordered.     Positive Risk Factor Screenings with Interventions:             General Health and ACP:  General  In general, how would you say your health is?: Good  In the past 7 days, have you experienced any of the following: New or Increased Pain, New or Increased Fatigue, Loneliness, Social Isolation, Stress or Anger?: No  Do you get the social and emotional support that you need?: Yes  Do you have a Living Will?: Yes    Advance Directives     Power of  Living Will ACP-Advance Directive ACP-Power of     Not on File Not on File Not on File Not on File      General Health Risk Interventions:  · Poor self-assessment of health status: patient advised to follow-up in this office for further evaluation and treatment of Any feelings of inadequate health within 1 month(s)    Health Habits/Nutrition:     Physical Activity: Inactive    Days of Exercise per Week: 0 days    Minutes of Exercise per Session: 0 min     Have you lost any weight without trying in the past 3 months?: No         Have you seen the dentist within the past year?: Yes      Health Habits/Nutrition Interventions:  · Inadequate physical activity:  Advised patient to begin walking 20 minutes 3 days a week     Hearing/Vision:  No exam data present  Hearing/Vision  Do you or your family notice any trouble with your hearing that hasn't been managed with hearing aids?: No  Do you have difficulty driving, watching TV, or doing any of your daily activities because of your eyesight?: No  Have you had an eye exam within the past year?: (!) No    Hearing/Vision Interventions:  · Vision concerns:  patient encouraged to make appointment with his/her eye specialist         Objective      Patient-Reported Vitals  Patient-Reported Systolic (Top): 278 mmHg  Patient-Reported Diastolic (Bottom): 62 mmHg             No Known Allergies  Prior to Visit Medications    Medication Sig Taking? Authorizing Provider   lisinopril-hydroCHLOROthiazide (PRINZIDE;ZESTORETIC) 10-12.5 MG per tablet Take 1 tablet by mouth 2 times daily  Kimani Rodriguez MD   finasteride (PROSCAR) 5 MG tablet TAKE 1 TABLET DAILY  Kimani Rodriguez MD   alfuzosin (UROXATRAL) 10 MG extended release tablet TAKE 1 TABLET DAILY  Kimani Rodriguez MD   simvastatin (ZOCOR) 20 MG tablet TAKE 1 TABLET NIGHTLY  Kimani Rodriguez MD   metoprolol succinate (TOPROL XL) 25 MG extended release tablet TAKE 1 TABLET DAILY  Kimani Rodriguez MD   aspirin 81 MG EC tablet Take 81 mg by mouth daily  Historical Provider, MD Coleman (Including outside providers/suppliers regularly involved in providing care):   Patient Care Team:  Kimani Rodriguez MD as PCP - General (Family Medicine)  Kimani Rodriguez MD as PCP - [facility], was evaluated through a synchronous (real-time) audio-video encounter. The patient (or guardian if applicable) is aware that this is a billable service, which includes applicable co-pays. This Virtual Visit was conducted with patient's (and/or legal guardian's) consent.  The visit was conducted pursuant to the emergency declaration under the [facility] waiver authority and the Coronavirus Preparedness and Response Supplemental Appropriations Act. Patient identification was verified, and a caregiver was present when appropriate. The patient was located at home in a state where the provider was licensed to provide care.

## 2022-02-22 NOTE — PATIENT INSTRUCTIONS
Personalized Preventive Plan for Tone Moore - 2/22/2022  Medicare offers a range of preventive health benefits. Some of the tests and screenings are paid in full while other may be subject to a deductible, co-insurance, and/or copay. Some of these benefits include a comprehensive review of your medical history including lifestyle, illnesses that may run in your family, and various assessments and screenings as appropriate. After reviewing your medical record and screening and assessments performed today your provider may have ordered immunizations, labs, imaging, and/or referrals for you. A list of these orders (if applicable) as well as your Preventive Care list are included within your After Visit Summary for your review. Other Preventive Recommendations:    · A preventive eye exam performed by an eye specialist is recommended every 1-2 years to screen for glaucoma; cataracts, macular degeneration, and other eye disorders. · A preventive dental visit is recommended every 6 months. · Try to get at least 150 minutes of exercise per week or 10,000 steps per day on a pedometer . · Order or download the FREE \"Exercise & Physical Activity: Your Everyday Guide\" from [employer]. Call 5-784.991.2736 or search [employer] online. · You need 1876-7657 mg of calcium and 0323-6119 IU of vitamin D per day. It is possible to meet your calcium requirement with diet alone, but a vitamin D supplement is usually necessary to meet this goal.  · When exposed to the sun, use a sunscreen that protects against both UVA and UVB radiation with an SPF of 30 or greater. Reapply every 2 to 3 hours or after sweating, drying off with a towel, or swimming. · Always wear a seat belt when traveling in a car. Always wear a helmet when riding a bicycle or motorcycle.

## 2022-04-15 DIAGNOSIS — I10 ESSENTIAL HYPERTENSION: ICD-10-CM

## 2022-04-15 DIAGNOSIS — E78.5 HYPERLIPIDEMIA, UNSPECIFIED HYPERLIPIDEMIA TYPE: ICD-10-CM

## 2022-04-15 RX ORDER — SIMVASTATIN 20 MG
TABLET ORAL
Qty: 90 TABLET | Refills: 3 | Status: SHIPPED | OUTPATIENT
Start: 2022-04-15

## 2022-04-15 RX ORDER — METOPROLOL SUCCINATE 25 MG/1
TABLET, EXTENDED RELEASE ORAL
Qty: 90 TABLET | Refills: 3 | Status: SHIPPED | OUTPATIENT
Start: 2022-04-15

## 2022-04-15 NOTE — TELEPHONE ENCOUNTER
Future Appointments    Encounter Information    Provider Department Appt Notes   8/19/2022 Ezra Khan MD [facility] Primary Care 6 month follow up       Past Visits    Date Provider Specialty Visit Type Primary Dx   02/22/2022 BEKAH Low - CNP Family Medicine Telemedicine Medicare annual wellness visit, subsequent   02/15/2022 Ezra Khan MD Family Medicine Office Visit Essential hypertension   01/11/2022 Ezra Khan MD Family Medicine Office Visit Essential hypertension

## 2022-08-19 ENCOUNTER — OFFICE VISIT (OUTPATIENT)
Dept: FAMILY MEDICINE CLINIC | Age: 87
End: 2022-08-19
Payer: MEDICARE

## 2022-08-19 VITALS
HEIGHT: 70 IN | OXYGEN SATURATION: 95 % | DIASTOLIC BLOOD PRESSURE: 70 MMHG | WEIGHT: 152.2 LBS | SYSTOLIC BLOOD PRESSURE: 138 MMHG | BODY MASS INDEX: 21.79 KG/M2 | HEART RATE: 57 BPM

## 2022-08-19 DIAGNOSIS — E78.5 HYPERLIPIDEMIA, UNSPECIFIED HYPERLIPIDEMIA TYPE: ICD-10-CM

## 2022-08-19 DIAGNOSIS — R73.9 HYPERGLYCEMIA: ICD-10-CM

## 2022-08-19 DIAGNOSIS — Z63.6 CAREGIVER STRESS: ICD-10-CM

## 2022-08-19 DIAGNOSIS — I10 ESSENTIAL HYPERTENSION: Primary | ICD-10-CM

## 2022-08-19 PROCEDURE — 1123F ACP DISCUSS/DSCN MKR DOCD: CPT | Performed by: FAMILY MEDICINE

## 2022-08-19 PROCEDURE — 99213 OFFICE O/P EST LOW 20 MIN: CPT | Performed by: FAMILY MEDICINE

## 2022-08-19 SDOH — SOCIAL STABILITY - SOCIAL INSECURITY: DEPENDENT RELATIVE NEEDING CARE AT HOME: Z63.6

## 2022-08-19 NOTE — PROGRESS NOTES
Chief Complaint   Patient presents with    Hypertension     6 month, pt is fasting        HPI:  Howard Sanabria is a 80 y.o. male    Follow up  Home bp numbers better than reading here today    He brought numbers with him     Looking good         Patient Active Problem List   Diagnosis    Hyperlipidemia    Hypertension    BPH (benign prostatic hyperplasia)    Closed nondisplaced fracture of fifth cervical vertebra [facility])       Wife having ongoing issues with neurologic disease  Unable to walk, speech/balance/vision issues     He is main caregiver  Stressful     Urologist - Dr. Natalie Swanson    Treatment Adherence:   Medication compliance:  compliant all of the time  Diet compliance:  compliant all of the time  Weight trend: stable  Current exercise: mowing lawn, golfing, caring for wife   Barriers: none    Hypertension:  Home blood pressure monitoring: Yes - good. He is adherent to a low sodium diet. Patient denies chest pain, shortness of breath, headache, blurred vision, peripheral edema, palpitations, dry cough and fatigue. Antihypertensive medication side effects: no medication side effects noted and orthostatic lightheadedness. Use of agents associated with hypertension: none. Sodium (mEq/L)   Date Value   02/15/2022 142    BUN (mg/dL)   Date Value   02/15/2022 31 (H)    Glucose (mg/dL)   Date Value   02/15/2022 108 (H)      Potassium (mEq/L)   Date Value   02/15/2022 4.7    Creatinine (mg/dL)   Date Value   02/15/2022 1.02         Hyperlipidemia:  No new myalgias or GI upset on simvastatin (Zocor).      Lab Results   Component Value Date    CHOL 117 02/15/2022    TRIG 77 02/15/2022    HDL 43 02/15/2022    LDLCALC 59 02/15/2022     Lab Results   Component Value Date    ALT 18 02/15/2022    AST 27 02/15/2022          BPH:  Doing ok on proscar and uroxatral      Past Medical History:   Diagnosis Date    Hyperlipidemia     Hypertension, well controlled      Past Surgical History:   Procedure Laterality Date    CATARACT REMOVAL  5/2012    bilateral    COLONOSCOPY  7/23/2008     History reviewed. No pertinent family history. Social History     Socioeconomic History    Marital status:      Spouse name: None    Number of children: None    Years of education: None    Highest education level: None   Tobacco Use    Smoking status: Former    Smokeless tobacco: Never     Social Determinants of Health     Financial Resource Strain: Low Risk     Difficulty of Paying Living Expenses: Not hard at all   Food Insecurity: No Food Insecurity    Worried About [address] in the Last Year: Never true    [address] [facility] IPDIA in the Last Year: Never true   Transportation Needs: No Transportation Needs    Lack of Transportation (Medical): No    Lack of Transportation (Non-Medical): No   Physical Activity: Inactive    Days of Exercise per Week: 0 days    Minutes of Exercise per Session: 0 min     Current Outpatient Medications   Medication Sig Dispense Refill    metoprolol succinate (TOPROL XL) 25 MG extended release tablet TAKE 1 TABLET DAILY 90 tablet 3    simvastatin (ZOCOR) 20 MG tablet TAKE 1 TABLET NIGHTLY 90 tablet 3    lisinopril-hydroCHLOROthiazide (PRINZIDE;ZESTORETIC) 10-12.5 MG per tablet Take 1 tablet by mouth 2 times daily 180 tablet 3    finasteride (PROSCAR) 5 MG tablet TAKE 1 TABLET DAILY 90 tablet 3    alfuzosin (UROXATRAL) 10 MG extended release tablet TAKE 1 TABLET DAILY 90 tablet 3    aspirin 81 MG EC tablet Take 81 mg by mouth daily       No current facility-administered medications for this visit.      No Known Allergies    Review of Systems:   General ROS: some fatigue negative for - chills,fever, malaise, weight gain or weight loss  Respiratory ROS: no cough, shortness of breath, or wheezing  Cardiovascular ROS: no chest pain or dyspnea on exertion  Gastrointestinal ROS: no abdominal pain, change in bowel habits, or black or bloody stools  Genito-Urinary ROS: no dysuria, trouble voiding, hx BPH  Musculoskeletal ROS: left leg pain  Neurological ROS: tingling right side of face intermittently   Psych: mood ok  In general patient otherwise reports feeling well. Physical Exam:  /70 (Site: Left Upper Arm)   Pulse 57   Ht 5' 10\" (1.778 m)   Wt 152 lb 3.2 oz (69 kg)   SpO2 95%   BMI 21.84 kg/m²     Gen: Well, NAD, Alert, Oriented x 3   HEENT: EOMI, eyes clear, MMM  Skin: without rash or jaundice,   Neck: no significant lymphadenopathy or thyromegaly  Lungs: CTA B w/out Rales/Wheezes/Rhonchi, Good respiratory effort   Heart: RRR, S1S2, w/out M/R/G, non-displaced PMI   Ext: No C/C/E Bilaterally. Neuro: Neurovascularly intact w/ Sensory/Motor intact UE/LE Bilaterally. Psych: euthymic    Lab Results   Component Value Date    WBC 5.7 02/15/2022    HGB 12.9 (L) 02/15/2022    HCT 38.8 (L) 02/15/2022     02/15/2022    CHOL 117 02/15/2022    TRIG 77 02/15/2022    HDL 43 02/15/2022    ALT 18 02/15/2022    AST 27 02/15/2022     02/15/2022    K 4.7 02/15/2022     02/15/2022    CREATININE 1.02 02/15/2022    BUN 31 (H) 02/15/2022    CO2 26 02/15/2022    TSH 2.680 06/24/2015    INR 1.1 02/25/2021    LABA1C 5.9 02/15/2022           A&P   Diagnosis Orders   1. Essential hypertension  CBC    Comprehensive Metabolic Panel      2. Hyperlipidemia, unspecified hyperlipidemia type        3. Caregiver stress        4.  Hyperglycemia  Hemoglobin A1C          Continue to monitor bp at home    Caregiver Stress ongoing    Fasting labs      Thaddeus Tolbert MD

## 2022-08-26 DIAGNOSIS — R73.9 HYPERGLYCEMIA: ICD-10-CM

## 2022-08-26 DIAGNOSIS — I10 ESSENTIAL HYPERTENSION: ICD-10-CM

## 2022-08-26 LAB
ALBUMIN SERPL-MCNC: 4 G/DL (ref 3.5–4.6)
ALP BLD-CCNC: 81 U/L (ref 35–104)
ALT SERPL-CCNC: 20 U/L (ref 0–41)
ANION GAP SERPL CALCULATED.3IONS-SCNC: 10 MEQ/L (ref 9–15)
AST SERPL-CCNC: 29 U/L (ref 0–40)
BILIRUB SERPL-MCNC: 0.8 MG/DL (ref 0.2–0.7)
BUN BLDV-MCNC: 32 MG/DL (ref 8–23)
CALCIUM SERPL-MCNC: 9.4 MG/DL (ref 8.5–9.9)
CHLORIDE BLD-SCNC: 104 MEQ/L (ref 95–107)
CO2: 25 MEQ/L (ref 20–31)
CREAT SERPL-MCNC: 1.32 MG/DL (ref 0.7–1.2)
GFR AFRICAN AMERICAN: >60
GFR NON-AFRICAN AMERICAN: 50.9
GLOBULIN: 3.1 G/DL (ref 2.3–3.5)
GLUCOSE BLD-MCNC: 112 MG/DL (ref 70–99)
HBA1C MFR BLD: 5.8 % (ref 4.8–5.9)
HCT VFR BLD CALC: 36.6 % (ref 42–52)
HEMOGLOBIN: 12.3 G/DL (ref 14–18)
MCH RBC QN AUTO: 32.9 PG (ref 27–31.3)
MCHC RBC AUTO-ENTMCNC: 33.6 % (ref 33–37)
MCV RBC AUTO: 97.9 FL (ref 80–100)
PDW BLD-RTO: 12.8 % (ref 11.5–14.5)
PLATELET # BLD: 156 K/UL (ref 130–400)
POTASSIUM SERPL-SCNC: 4.5 MEQ/L (ref 3.4–4.9)
RBC # BLD: 3.74 M/UL (ref 4.7–6.1)
SODIUM BLD-SCNC: 139 MEQ/L (ref 135–144)
TOTAL PROTEIN: 7.1 G/DL (ref 6.3–8)
WBC # BLD: 5.5 K/UL (ref 4.8–10.8)

## 2022-10-03 RX ORDER — ALFUZOSIN HYDROCHLORIDE 10 MG/1
TABLET, EXTENDED RELEASE ORAL
Qty: 90 TABLET | Refills: 3 | Status: SHIPPED | OUTPATIENT
Start: 2022-10-03

## 2022-10-03 RX ORDER — FINASTERIDE 5 MG/1
TABLET, FILM COATED ORAL
Qty: 90 TABLET | Refills: 3 | Status: SHIPPED | OUTPATIENT
Start: 2022-10-03

## 2022-10-03 NOTE — TELEPHONE ENCOUNTER
Future Appointments    Encounter Information    Provider Department Appt Notes   2/24/2023 Hakeem Virk MD [facility] Primary Care 6 month f/u     Past Visits    Date Provider Specialty Visit Type Primary Dx   08/19/2022 Hakeem Virk MD Family Medicine Office Visit Essential hypertension   02/22/2022 BEKAH Burrows - CNP Family Medicine Telemedicine Medicare annual wellness visit, subsequent   02/15/2022 Hakeem Virk MD Family Medicine Office Visit Essential hypertension   01/11/2022 Hakeem Virk MD Family Medicine Office Visit Essential hypertension   08/17/2021 Hakeem Virk MD Family Medicine Office Visit Essential hypertension

## 2023-03-03 ENCOUNTER — OFFICE VISIT (OUTPATIENT)
Dept: FAMILY MEDICINE CLINIC | Age: 88
End: 2023-03-03

## 2023-03-03 VITALS
BODY MASS INDEX: 22.48 KG/M2 | HEART RATE: 65 BPM | SYSTOLIC BLOOD PRESSURE: 172 MMHG | DIASTOLIC BLOOD PRESSURE: 77 MMHG | OXYGEN SATURATION: 98 % | WEIGHT: 157 LBS | TEMPERATURE: 97.3 F | HEIGHT: 70 IN

## 2023-03-03 DIAGNOSIS — Z91.81 AT HIGH RISK FOR FALLS: ICD-10-CM

## 2023-03-03 DIAGNOSIS — Z63.6 CAREGIVER STRESS: ICD-10-CM

## 2023-03-03 DIAGNOSIS — R42 ORTHOSTATIC LIGHTHEADEDNESS: Primary | ICD-10-CM

## 2023-03-03 DIAGNOSIS — I10 ESSENTIAL HYPERTENSION: ICD-10-CM

## 2023-03-03 DIAGNOSIS — E78.5 HYPERLIPIDEMIA, UNSPECIFIED HYPERLIPIDEMIA TYPE: ICD-10-CM

## 2023-03-03 DIAGNOSIS — R53.83 OTHER FATIGUE: ICD-10-CM

## 2023-03-03 RX ORDER — LISINOPRIL AND HYDROCHLOROTHIAZIDE 12.5; 1 MG/1; MG/1
1 TABLET ORAL EVERY EVENING
Qty: 90 TABLET | Refills: 3 | Status: SHIPPED | COMMUNITY
Start: 2023-03-03

## 2023-03-03 RX ORDER — ALFUZOSIN HYDROCHLORIDE 10 MG/1
TABLET, EXTENDED RELEASE ORAL
Qty: 90 TABLET | Refills: 3 | Status: SHIPPED | OUTPATIENT
Start: 2023-03-03

## 2023-03-03 RX ORDER — METOPROLOL SUCCINATE 25 MG/1
TABLET, EXTENDED RELEASE ORAL
Qty: 90 TABLET | Refills: 3 | Status: SHIPPED | OUTPATIENT
Start: 2023-03-03

## 2023-03-03 RX ORDER — SIMVASTATIN 20 MG
TABLET ORAL
Qty: 90 TABLET | Refills: 3 | Status: SHIPPED | OUTPATIENT
Start: 2023-03-03

## 2023-03-03 SDOH — SOCIAL STABILITY - SOCIAL INSECURITY: DEPENDENT RELATIVE NEEDING CARE AT HOME: Z63.6

## 2023-03-03 SDOH — ECONOMIC STABILITY: FOOD INSECURITY: WITHIN THE PAST 12 MONTHS, THE FOOD YOU BOUGHT JUST DIDN'T LAST AND YOU DIDN'T HAVE MONEY TO GET MORE.: NEVER TRUE

## 2023-03-03 SDOH — ECONOMIC STABILITY: INCOME INSECURITY: HOW HARD IS IT FOR YOU TO PAY FOR THE VERY BASICS LIKE FOOD, HOUSING, MEDICAL CARE, AND HEATING?: NOT HARD AT ALL

## 2023-03-03 SDOH — ECONOMIC STABILITY: FOOD INSECURITY: WITHIN THE PAST 12 MONTHS, YOU WORRIED THAT YOUR FOOD WOULD RUN OUT BEFORE YOU GOT MONEY TO BUY MORE.: NEVER TRUE

## 2023-03-03 SDOH — ECONOMIC STABILITY: HOUSING INSECURITY
IN THE LAST 12 MONTHS, WAS THERE A TIME WHEN YOU DID NOT HAVE A STEADY PLACE TO SLEEP OR SLEPT IN A SHELTER (INCLUDING NOW)?: NO

## 2023-03-03 ASSESSMENT — PATIENT HEALTH QUESTIONNAIRE - PHQ9
1. LITTLE INTEREST OR PLEASURE IN DOING THINGS: 0
SUM OF ALL RESPONSES TO PHQ QUESTIONS 1-9: 0
SUM OF ALL RESPONSES TO PHQ9 QUESTIONS 1 & 2: 0
2. FEELING DOWN, DEPRESSED OR HOPELESS: 0
SUM OF ALL RESPONSES TO PHQ QUESTIONS 1-9: 0

## 2023-03-07 DIAGNOSIS — I10 ESSENTIAL HYPERTENSION: ICD-10-CM

## 2023-03-07 DIAGNOSIS — R53.83 OTHER FATIGUE: ICD-10-CM

## 2023-03-07 LAB
ALBUMIN SERPL-MCNC: 3.8 G/DL (ref 3.5–4.6)
ALP BLD-CCNC: 80 U/L (ref 35–104)
ALT SERPL-CCNC: 18 U/L (ref 0–41)
ANION GAP SERPL CALCULATED.3IONS-SCNC: 10 MEQ/L (ref 9–15)
AST SERPL-CCNC: 30 U/L (ref 0–40)
BILIRUB SERPL-MCNC: 0.6 MG/DL (ref 0.2–0.7)
BUN BLDV-MCNC: 35 MG/DL (ref 8–23)
CALCIUM SERPL-MCNC: 9.1 MG/DL (ref 8.5–9.9)
CHLORIDE BLD-SCNC: 103 MEQ/L (ref 95–107)
CHOLESTEROL, TOTAL: 118 MG/DL (ref 0–199)
CO2: 24 MEQ/L (ref 20–31)
CREAT SERPL-MCNC: 1.18 MG/DL (ref 0.7–1.2)
GFR SERPL CREATININE-BSD FRML MDRD: 58.3 ML/MIN/{1.73_M2}
GLOBULIN: 2.9 G/DL (ref 2.3–3.5)
GLUCOSE BLD-MCNC: 104 MG/DL (ref 70–99)
HCT VFR BLD CALC: 37.8 % (ref 42–52)
HDLC SERPL-MCNC: 41 MG/DL (ref 40–59)
HEMOGLOBIN: 12.7 G/DL (ref 14–18)
LDL CHOLESTEROL CALCULATED: 64 MG/DL (ref 0–129)
MCH RBC QN AUTO: 32.8 PG (ref 27–31.3)
MCHC RBC AUTO-ENTMCNC: 33.5 % (ref 33–37)
MCV RBC AUTO: 98 FL (ref 79–92.2)
PDW BLD-RTO: 13.3 % (ref 11.5–14.5)
PLATELET # BLD: 148 K/UL (ref 130–400)
POTASSIUM SERPL-SCNC: 4.5 MEQ/L (ref 3.4–4.9)
RBC # BLD: 3.86 M/UL (ref 4.7–6.1)
SODIUM BLD-SCNC: 137 MEQ/L (ref 135–144)
TOTAL PROTEIN: 6.7 G/DL (ref 6.3–8)
TRIGL SERPL-MCNC: 64 MG/DL (ref 0–150)
TSH SERPL DL<=0.05 MIU/L-ACNC: 3.38 UIU/ML (ref 0.44–3.86)
WBC # BLD: 5.8 K/UL (ref 4.8–10.8)

## 2023-09-05 ENCOUNTER — OFFICE VISIT (OUTPATIENT)
Dept: FAMILY MEDICINE CLINIC | Age: 88
End: 2023-09-05
Payer: COMMERCIAL

## 2023-09-05 VITALS
WEIGHT: 154 LBS | OXYGEN SATURATION: 98 % | DIASTOLIC BLOOD PRESSURE: 60 MMHG | TEMPERATURE: 96.7 F | BODY MASS INDEX: 22.05 KG/M2 | HEIGHT: 70 IN | HEART RATE: 66 BPM | SYSTOLIC BLOOD PRESSURE: 126 MMHG

## 2023-09-05 DIAGNOSIS — E78.5 HYPERLIPIDEMIA, UNSPECIFIED HYPERLIPIDEMIA TYPE: Primary | ICD-10-CM

## 2023-09-05 DIAGNOSIS — R42 ORTHOSTATIC LIGHTHEADEDNESS: ICD-10-CM

## 2023-09-05 DIAGNOSIS — I10 ESSENTIAL HYPERTENSION: ICD-10-CM

## 2023-09-05 DIAGNOSIS — R73.9 HYPERGLYCEMIA: ICD-10-CM

## 2023-09-05 DIAGNOSIS — Z63.6 CAREGIVER STRESS: ICD-10-CM

## 2023-09-05 PROCEDURE — G8420 CALC BMI NORM PARAMETERS: HCPCS | Performed by: FAMILY MEDICINE

## 2023-09-05 PROCEDURE — 99213 OFFICE O/P EST LOW 20 MIN: CPT | Performed by: FAMILY MEDICINE

## 2023-09-05 PROCEDURE — 1036F TOBACCO NON-USER: CPT | Performed by: FAMILY MEDICINE

## 2023-09-05 PROCEDURE — G8427 DOCREV CUR MEDS BY ELIG CLIN: HCPCS | Performed by: FAMILY MEDICINE

## 2023-09-05 PROCEDURE — 1123F ACP DISCUSS/DSCN MKR DOCD: CPT | Performed by: FAMILY MEDICINE

## 2023-09-05 RX ORDER — LISINOPRIL AND HYDROCHLOROTHIAZIDE 12.5; 1 MG/1; MG/1
1 TABLET ORAL EVERY EVENING
Qty: 90 TABLET | Refills: 3 | Status: SHIPPED | OUTPATIENT
Start: 2023-09-05

## 2023-09-05 SDOH — SOCIAL STABILITY - SOCIAL INSECURITY: DEPENDENT RELATIVE NEEDING CARE AT HOME: Z63.6

## 2023-10-02 RX ORDER — FINASTERIDE 5 MG/1
5 TABLET, FILM COATED ORAL DAILY
Qty: 90 TABLET | Refills: 3 | Status: SHIPPED | OUTPATIENT
Start: 2023-10-02

## 2024-01-21 DIAGNOSIS — I10 ESSENTIAL HYPERTENSION: ICD-10-CM

## 2024-01-21 DIAGNOSIS — E78.5 HYPERLIPIDEMIA, UNSPECIFIED HYPERLIPIDEMIA TYPE: ICD-10-CM

## 2024-01-22 RX ORDER — SIMVASTATIN 20 MG
TABLET ORAL
Qty: 90 TABLET | Refills: 3 | Status: SHIPPED | OUTPATIENT
Start: 2024-01-22

## 2024-01-22 RX ORDER — METOPROLOL SUCCINATE 25 MG/1
TABLET, EXTENDED RELEASE ORAL
Qty: 90 TABLET | Refills: 3 | Status: SHIPPED | OUTPATIENT
Start: 2024-01-22

## 2024-03-05 ENCOUNTER — OFFICE VISIT (OUTPATIENT)
Dept: FAMILY MEDICINE CLINIC | Age: 89
End: 2024-03-05
Payer: COMMERCIAL

## 2024-03-05 VITALS
SYSTOLIC BLOOD PRESSURE: 128 MMHG | BODY MASS INDEX: 21.39 KG/M2 | TEMPERATURE: 96.8 F | WEIGHT: 149.4 LBS | HEIGHT: 70 IN | OXYGEN SATURATION: 98 % | HEART RATE: 62 BPM | DIASTOLIC BLOOD PRESSURE: 70 MMHG

## 2024-03-05 DIAGNOSIS — R73.9 HYPERGLYCEMIA: ICD-10-CM

## 2024-03-05 DIAGNOSIS — I10 ESSENTIAL HYPERTENSION: ICD-10-CM

## 2024-03-05 DIAGNOSIS — Z63.6 CAREGIVER STRESS: ICD-10-CM

## 2024-03-05 DIAGNOSIS — R42 ORTHOSTATIC LIGHTHEADEDNESS: ICD-10-CM

## 2024-03-05 DIAGNOSIS — R53.83 OTHER FATIGUE: ICD-10-CM

## 2024-03-05 DIAGNOSIS — E78.5 HYPERLIPIDEMIA, UNSPECIFIED HYPERLIPIDEMIA TYPE: Primary | ICD-10-CM

## 2024-03-05 LAB
ALBUMIN SERPL-MCNC: 4.1 G/DL (ref 3.5–4.6)
ALP SERPL-CCNC: 97 U/L (ref 35–104)
ALT SERPL-CCNC: 16 U/L (ref 0–41)
ANION GAP SERPL CALCULATED.3IONS-SCNC: 10 MEQ/L (ref 9–15)
AST SERPL-CCNC: 35 U/L (ref 0–40)
BILIRUB SERPL-MCNC: 0.6 MG/DL (ref 0.2–0.7)
BUN SERPL-MCNC: 29 MG/DL (ref 8–23)
CALCIUM SERPL-MCNC: 9.8 MG/DL (ref 8.5–9.9)
CHLORIDE SERPL-SCNC: 104 MEQ/L (ref 95–107)
CHOLEST SERPL-MCNC: 126 MG/DL (ref 0–199)
CO2 SERPL-SCNC: 26 MEQ/L (ref 20–31)
CREAT SERPL-MCNC: 1.21 MG/DL (ref 0.7–1.2)
ERYTHROCYTE [DISTWIDTH] IN BLOOD BY AUTOMATED COUNT: 12.6 % (ref 11.5–14.5)
GLOBULIN SER CALC-MCNC: 3.2 G/DL (ref 2.3–3.5)
GLUCOSE SERPL-MCNC: 122 MG/DL (ref 70–99)
HBA1C MFR BLD: 5.9 % (ref 4.8–5.9)
HCT VFR BLD AUTO: 42 % (ref 42–52)
HDLC SERPL-MCNC: 49 MG/DL (ref 40–59)
HGB BLD-MCNC: 13.6 G/DL (ref 14–18)
LDLC SERPL CALC-MCNC: 63 MG/DL (ref 0–129)
MCH RBC QN AUTO: 31.9 PG (ref 27–31.3)
MCHC RBC AUTO-ENTMCNC: 32.4 % (ref 33–37)
MCV RBC AUTO: 98.4 FL (ref 79–92.2)
PLATELET # BLD AUTO: 199 K/UL (ref 130–400)
POTASSIUM SERPL-SCNC: 5.1 MEQ/L (ref 3.4–4.9)
PROT SERPL-MCNC: 7.3 G/DL (ref 6.3–8)
RBC # BLD AUTO: 4.27 M/UL (ref 4.7–6.1)
SODIUM SERPL-SCNC: 140 MEQ/L (ref 135–144)
TRIGL SERPL-MCNC: 69 MG/DL (ref 0–150)
TSH SERPL-MCNC: 3.18 UIU/ML (ref 0.44–3.86)
WBC # BLD AUTO: 6.1 K/UL (ref 4.8–10.8)

## 2024-03-05 PROCEDURE — 99214 OFFICE O/P EST MOD 30 MIN: CPT | Performed by: FAMILY MEDICINE

## 2024-03-05 PROCEDURE — G8484 FLU IMMUNIZE NO ADMIN: HCPCS | Performed by: FAMILY MEDICINE

## 2024-03-05 PROCEDURE — 1036F TOBACCO NON-USER: CPT | Performed by: FAMILY MEDICINE

## 2024-03-05 PROCEDURE — G8427 DOCREV CUR MEDS BY ELIG CLIN: HCPCS | Performed by: FAMILY MEDICINE

## 2024-03-05 PROCEDURE — 1123F ACP DISCUSS/DSCN MKR DOCD: CPT | Performed by: FAMILY MEDICINE

## 2024-03-05 PROCEDURE — G8420 CALC BMI NORM PARAMETERS: HCPCS | Performed by: FAMILY MEDICINE

## 2024-03-05 SDOH — ECONOMIC STABILITY: FOOD INSECURITY: WITHIN THE PAST 12 MONTHS, YOU WORRIED THAT YOUR FOOD WOULD RUN OUT BEFORE YOU GOT MONEY TO BUY MORE.: NEVER TRUE

## 2024-03-05 SDOH — ECONOMIC STABILITY: FOOD INSECURITY: WITHIN THE PAST 12 MONTHS, THE FOOD YOU BOUGHT JUST DIDN'T LAST AND YOU DIDN'T HAVE MONEY TO GET MORE.: NEVER TRUE

## 2024-03-05 SDOH — ECONOMIC STABILITY: INCOME INSECURITY: HOW HARD IS IT FOR YOU TO PAY FOR THE VERY BASICS LIKE FOOD, HOUSING, MEDICAL CARE, AND HEATING?: NOT HARD AT ALL

## 2024-03-05 SDOH — SOCIAL STABILITY - SOCIAL INSECURITY: DEPENDENT RELATIVE NEEDING CARE AT HOME: Z63.6

## 2024-03-05 ASSESSMENT — PATIENT HEALTH QUESTIONNAIRE - PHQ9
2. FEELING DOWN, DEPRESSED OR HOPELESS: 0
SUM OF ALL RESPONSES TO PHQ QUESTIONS 1-9: 0
1. LITTLE INTEREST OR PLEASURE IN DOING THINGS: 0
SUM OF ALL RESPONSES TO PHQ QUESTIONS 1-9: 0
SUM OF ALL RESPONSES TO PHQ QUESTIONS 1-9: 0
SUM OF ALL RESPONSES TO PHQ9 QUESTIONS 1 & 2: 0
SUM OF ALL RESPONSES TO PHQ QUESTIONS 1-9: 0

## 2024-03-05 NOTE — PROGRESS NOTES
Chief Complaint   Patient presents with    Hypertension     6 month        HPI:  Polo Rodriguez is a 89 y.o. male    Follow up  Home bp checks ok   Was high this morning       Patient Active Problem List   Diagnosis    Hyperlipidemia    Hypertension    BPH (benign prostatic hyperplasia)    Closed nondisplaced fracture of fifth cervical vertebra (HCC)       Wife having ongoing issues with neurologic disease  Unable to walk, speech/balance/vision issues   Hospice care/home care   He is main caregiver  Stressful     Urologist - Dr. Sarah    Treatment Adherence:   Medication compliance:  compliant all of the time  Diet compliance:  compliant all of the time  Weight trend: stable  Current exercise: mowing lawn, golfing, caring for wife   Barriers: none    Hypertension:  Home blood pressure monitoring: Yes - good.  He is adherent to a low sodium diet. Patient denies chest pain, shortness of breath, headache, blurred vision, peripheral edema, palpitations, dry cough and fatigue.  Antihypertensive medication side effects: no medication side effects noted and orthostatic lightheadedness.  Use of agents associated with hypertension: none.                                        Sodium (mEq/L)   Date Value   03/07/2023 137    BUN (mg/dL)   Date Value   03/07/2023 35 (H)    Glucose (mg/dL)   Date Value   03/07/2023 104 (H)      Potassium (mEq/L)   Date Value   03/07/2023 4.5    Creatinine (mg/dL)   Date Value   03/07/2023 1.18         Hyperlipidemia:  No new myalgias or GI upset on simvastatin (Zocor).     Lab Results   Component Value Date    CHOL 118 03/07/2023    TRIG 64 03/07/2023    HDL 41 03/07/2023    LDLCALC 64 03/07/2023     Lab Results   Component Value Date    ALT 18 03/07/2023    AST 30 03/07/2023          BPH:  Doing ok on proscar and uroxatral      Past Medical History:   Diagnosis Date    Hyperlipidemia     Hypertension, well controlled      Past Surgical History:   Procedure Laterality Date    CATARACT

## 2024-06-05 RX ORDER — TAMSULOSIN HYDROCHLORIDE 0.4 MG/1
0.4 CAPSULE ORAL DAILY
Qty: 90 CAPSULE | Refills: 3 | Status: SHIPPED | OUTPATIENT
Start: 2024-06-05

## 2024-06-05 NOTE — TELEPHONE ENCOUNTER
Comments:     Last Office Visit (last PCP visit):   3/5/2024    Next Visit Date:  Future Appointments   Date Time Provider Department Center   9/6/2024  8:30 AM Jerod Alford MD [facility]       **If hasn't been seen in over a year OR hasn't followed up according to last diabetes/ADHD visit, make appointment for patient before sending refill to provider.    Rx requested:  Requested Prescriptions     Pending Prescriptions Disp Refills    tamsulosin (FLOMAX) 0.4 MG capsule [Pharmacy Med Name: Tamsulosin HCl 0.4 MG Oral Capsule] 90 capsule 3     Sig: TAKE 1 CAPSULE BY MOUTH DAILY

## 2024-07-24 DIAGNOSIS — I10 ESSENTIAL HYPERTENSION: ICD-10-CM

## 2024-07-24 RX ORDER — LISINOPRIL AND HYDROCHLOROTHIAZIDE 12.5; 1 MG/1; MG/1
1 TABLET ORAL EVERY EVENING
Qty: 90 TABLET | Refills: 3 | Status: SHIPPED | OUTPATIENT
Start: 2024-07-24

## 2024-07-24 NOTE — TELEPHONE ENCOUNTER
Comments:     Last Office Visit (last PCP visit):   3/5/2024    Next Visit Date:  Future Appointments   Date Time Provider Department Center   9/6/2024  8:30 AM Jerod Alford MD [facility]       **If hasn't been seen in over a year OR hasn't followed up according to last diabetes/ADHD visit, make appointment for patient before sending refill to provider.    Rx requested:  Requested Prescriptions     Pending Prescriptions Disp Refills    lisinopril-hydroCHLOROthiazide (PRINZIDE;ZESTORETIC) 10-12.5 MG per tablet [Pharmacy Med Name: Lisinopril-hydroCHLOROthiazide 10-12.5 MG Oral Tablet] 90 tablet 3     Sig: TAKE 1 TABLET BY MOUTH IN THE  EVENING

## 2024-09-06 ENCOUNTER — OFFICE VISIT (OUTPATIENT)
Dept: FAMILY MEDICINE CLINIC | Age: 89
End: 2024-09-06
Payer: COMMERCIAL

## 2024-09-06 VITALS
BODY MASS INDEX: 21.07 KG/M2 | OXYGEN SATURATION: 98 % | TEMPERATURE: 97.7 F | WEIGHT: 147.2 LBS | HEIGHT: 70 IN | SYSTOLIC BLOOD PRESSURE: 126 MMHG | HEART RATE: 60 BPM | DIASTOLIC BLOOD PRESSURE: 72 MMHG

## 2024-09-06 DIAGNOSIS — I10 ESSENTIAL HYPERTENSION: Primary | ICD-10-CM

## 2024-09-06 DIAGNOSIS — E78.5 HYPERLIPIDEMIA, UNSPECIFIED HYPERLIPIDEMIA TYPE: ICD-10-CM

## 2024-09-06 DIAGNOSIS — R42 ORTHOSTATIC LIGHTHEADEDNESS: ICD-10-CM

## 2024-09-06 DIAGNOSIS — Z63.6 CAREGIVER STRESS: ICD-10-CM

## 2024-09-06 DIAGNOSIS — R53.83 OTHER FATIGUE: ICD-10-CM

## 2024-09-06 PROCEDURE — 1123F ACP DISCUSS/DSCN MKR DOCD: CPT | Performed by: FAMILY MEDICINE

## 2024-09-06 PROCEDURE — G8427 DOCREV CUR MEDS BY ELIG CLIN: HCPCS | Performed by: FAMILY MEDICINE

## 2024-09-06 PROCEDURE — 99214 OFFICE O/P EST MOD 30 MIN: CPT | Performed by: FAMILY MEDICINE

## 2024-09-06 PROCEDURE — 1036F TOBACCO NON-USER: CPT | Performed by: FAMILY MEDICINE

## 2024-09-06 PROCEDURE — G8420 CALC BMI NORM PARAMETERS: HCPCS | Performed by: FAMILY MEDICINE

## 2024-09-06 RX ORDER — FINASTERIDE 5 MG/1
5 TABLET, FILM COATED ORAL DAILY
Qty: 90 TABLET | Refills: 3 | Status: SHIPPED | OUTPATIENT
Start: 2024-09-06

## 2024-09-06 RX ORDER — MELOXICAM 7.5 MG/1
7.5 TABLET ORAL DAILY PRN
Qty: 30 TABLET | Refills: 2 | Status: SHIPPED | OUTPATIENT
Start: 2024-09-06

## 2024-09-06 SDOH — SOCIAL STABILITY - SOCIAL INSECURITY: DEPENDENT RELATIVE NEEDING CARE AT HOME: Z63.6

## 2024-09-06 NOTE — PROGRESS NOTES
Chief Complaint   Patient presents with    Hyperlipidemia     6 month        HPI:  Polo Rodriguez is a 89 y.o. male    Follow up  Home bp checks ok - brought a list   Was high this morning     Nose blocked up this morning     Patient Active Problem List   Diagnosis    Hyperlipidemia    Hypertension    BPH (benign prostatic hyperplasia)    Closed nondisplaced fracture of fifth cervical vertebra (HCC)       Wife having ongoing issues with neurologic disease  Unable to walk, speech/balance/vision issues   home care   He is main caregiver  Stressful   Has left shoulder pain     Urologist - Dr. Sarah    Treatment Adherence:   Medication compliance:  compliant all of the time  Diet compliance:  compliant all of the time  Weight trend: stable  Current exercise: mowing lawn, golfing, caring for wife   Barriers: none    Hypertension:  Home blood pressure monitoring: Yes - good.  He is adherent to a low sodium diet. Patient denies chest pain, shortness of breath, headache, blurred vision, peripheral edema, palpitations, dry cough and fatigue.  Antihypertensive medication side effects: no medication side effects noted and orthostatic lightheadedness.  Use of agents associated with hypertension: none.                                        Sodium (mEq/L)   Date Value   03/05/2024 140    BUN (mg/dL)   Date Value   03/05/2024 29 (H)    Glucose (mg/dL)   Date Value   03/05/2024 122 (H)      Potassium (mEq/L)   Date Value   03/05/2024 5.1 (H)    Creatinine (mg/dL)   Date Value   03/05/2024 1.21 (H)         Hyperlipidemia:  No new myalgias or GI upset on simvastatin (Zocor).     Lab Results   Component Value Date    CHOL 126 03/05/2024    TRIG 69 03/05/2024    HDL 49 03/05/2024     Lab Results   Component Value Date    ALT 16 03/05/2024    AST 35 03/05/2024          BPH:  Doing ok on proscar and uroxatral      Past Medical History:   Diagnosis Date    Hyperlipidemia     Hypertension, well controlled      Past Surgical History:

## 2024-09-26 ENCOUNTER — TELEPHONE (OUTPATIENT)
Dept: FAMILY MEDICINE CLINIC | Age: 89
End: 2024-09-26

## 2024-09-27 ENCOUNTER — OFFICE VISIT (OUTPATIENT)
Dept: FAMILY MEDICINE CLINIC | Age: 89
End: 2024-09-27
Payer: COMMERCIAL

## 2024-09-27 VITALS
HEIGHT: 70 IN | SYSTOLIC BLOOD PRESSURE: 138 MMHG | WEIGHT: 144.4 LBS | OXYGEN SATURATION: 97 % | BODY MASS INDEX: 20.67 KG/M2 | DIASTOLIC BLOOD PRESSURE: 60 MMHG | HEART RATE: 67 BPM

## 2024-09-27 DIAGNOSIS — M79.604 ACUTE PAIN OF RIGHT LOWER EXTREMITY: ICD-10-CM
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
## 2024-09-27 DIAGNOSIS — R42 DIZZINESS: Primary | ICD-10-CM

## 2024-09-27 DIAGNOSIS — S51.812A SKIN TEAR OF LEFT FOREARM WITHOUT COMPLICATION, INITIAL ENCOUNTER: ICD-10-CM

## 2024-09-27 DIAGNOSIS — R42 DIZZINESS: ICD-10-CM

## 2024-09-27 LAB
ALBUMIN SERPL-MCNC: 3.7 G/DL (ref 3.5–4.6)
ALP SERPL-CCNC: 136 U/L (ref 35–104)
ALT SERPL-CCNC: 16 U/L (ref 0–41)
ANION GAP SERPL CALCULATED.3IONS-SCNC: 9 MEQ/L (ref 9–15)
AST SERPL-CCNC: 22 U/L (ref 0–40)
BILIRUB SERPL-MCNC: 0.4 MG/DL (ref 0.2–0.7)
BUN SERPL-MCNC: 34 MG/DL (ref 8–23)
CALCIUM SERPL-MCNC: 9.6 MG/DL (ref 8.5–9.9)
CHLORIDE SERPL-SCNC: 102 MEQ/L (ref 95–107)
CO2 SERPL-SCNC: 27 MEQ/L (ref 20–31)
CREAT SERPL-MCNC: 1.1 MG/DL (ref 0.7–1.2)
ERYTHROCYTE [DISTWIDTH] IN BLOOD BY AUTOMATED COUNT: 12.3 % (ref 11.5–14.5)
GLOBULIN SER CALC-MCNC: 3.2 G/DL (ref 2.3–3.5)
GLUCOSE SERPL-MCNC: 115 MG/DL (ref 70–99)
HCT VFR BLD AUTO: 39.4 % (ref 42–52)
HGB BLD-MCNC: 13.2 G/DL (ref 14–18)
MCH RBC QN AUTO: 32.5 PG (ref 27–31.3)
MCHC RBC AUTO-ENTMCNC: 33.5 % (ref 33–37)
MCV RBC AUTO: 97 FL (ref 79–92.2)
PLATELET # BLD AUTO: 253 K/UL (ref 130–400)
POTASSIUM SERPL-SCNC: 4.5 MEQ/L (ref 3.4–4.9)
PROT SERPL-MCNC: 6.9 G/DL (ref 6.3–8)
RBC # BLD AUTO: 4.06 M/UL (ref 4.7–6.1)
SODIUM SERPL-SCNC: 138 MEQ/L (ref 135–144)
WBC # BLD AUTO: 7.5 K/UL (ref 4.8–10.8)

## 2024-09-27 PROCEDURE — G8420 CALC BMI NORM PARAMETERS: HCPCS | Performed by: NURSE PRACTITIONER

## 2024-09-27 PROCEDURE — 99214 OFFICE O/P EST MOD 30 MIN: CPT | Performed by: NURSE PRACTITIONER

## 2024-09-27 PROCEDURE — 1123F ACP DISCUSS/DSCN MKR DOCD: CPT | Performed by: NURSE PRACTITIONER

## 2024-09-27 PROCEDURE — G8427 DOCREV CUR MEDS BY ELIG CLIN: HCPCS | Performed by: NURSE PRACTITIONER

## 2024-09-27 PROCEDURE — 1036F TOBACCO NON-USER: CPT | Performed by: NURSE PRACTITIONER

## 2024-09-27 RX ORDER — MUPIROCIN 20 MG/G
OINTMENT TOPICAL
Qty: 1 EACH | Refills: 0 | Status: SHIPPED | OUTPATIENT
Start: 2024-09-27

## 2024-09-27 NOTE — PROGRESS NOTES
Dispense:  1 each     Refill:  0       There are no discontinued medications.    No follow-ups on file.    Lynn Herman, APRN - CNP

## 2024-09-30 ENCOUNTER — OFFICE VISIT (OUTPATIENT)
Dept: FAMILY MEDICINE CLINIC | Age: 89
End: 2024-09-30
Payer: COMMERCIAL

## 2024-09-30 VITALS
TEMPERATURE: 97.6 F | WEIGHT: 144 LBS | HEART RATE: 69 BPM | OXYGEN SATURATION: 99 % | DIASTOLIC BLOOD PRESSURE: 68 MMHG | BODY MASS INDEX: 20.62 KG/M2 | SYSTOLIC BLOOD PRESSURE: 128 MMHG | HEIGHT: 70 IN

## 2024-09-30 DIAGNOSIS — I10 ESSENTIAL HYPERTENSION: ICD-10-CM

## 2024-09-30 DIAGNOSIS — R53.83 OTHER FATIGUE: ICD-10-CM

## 2024-09-30 DIAGNOSIS — R42 ORTHOSTATIC LIGHTHEADEDNESS: ICD-10-CM

## 2024-09-30 DIAGNOSIS — R26.0 ATAXIC GAIT: ICD-10-CM

## 2024-09-30 DIAGNOSIS — R42 DIZZINESS: Primary | ICD-10-CM

## 2024-09-30 PROCEDURE — 1036F TOBACCO NON-USER: CPT | Performed by: FAMILY MEDICINE

## 2024-09-30 PROCEDURE — G8420 CALC BMI NORM PARAMETERS: HCPCS | Performed by: FAMILY MEDICINE

## 2024-09-30 PROCEDURE — 1123F ACP DISCUSS/DSCN MKR DOCD: CPT | Performed by: FAMILY MEDICINE

## 2024-09-30 PROCEDURE — 99213 OFFICE O/P EST LOW 20 MIN: CPT | Performed by: FAMILY MEDICINE

## 2024-09-30 PROCEDURE — G8427 DOCREV CUR MEDS BY ELIG CLIN: HCPCS | Performed by: FAMILY MEDICINE

## 2024-09-30 RX ORDER — METOPROLOL SUCCINATE 25 MG/1
TABLET, EXTENDED RELEASE ORAL
COMMUNITY
Start: 2024-09-30

## 2024-09-30 NOTE — PROGRESS NOTES
Chief Complaint   Patient presents with    Dizziness     Saw Lynn Friday, follow up, \"not himself\", right leg sore, did xray and blood work     Wound Check     Would like arm looked at radha       HPI:  Polo Rodriguez is a 89 y.o. male    Follow up  Was in on Friday  BW done normal    Feeling unsteady on feet         Patient Active Problem List   Diagnosis    Hyperlipidemia    Hypertension    BPH (benign prostatic hyperplasia)    Closed nondisplaced fracture of fifth cervical vertebra (HCC)       Wife having ongoing issues with neurologic disease  Unable to walk, speech/balance/vision issues   home care   He is main caregiver  Stressful   Has left shoulder pain     Urologist - Dr. Sarah    Treatment Adherence:   Medication compliance:  compliant all of the time  Diet compliance:  compliant all of the time  Weight trend: stable  Current exercise: mowing lawn, golfing, caring for wife   Barriers: none    Hypertension:  checking home bp                                       Sodium (mEq/L)   Date Value   09/27/2024 138    BUN (mg/dL)   Date Value   09/27/2024 34 (H)    Glucose (mg/dL)   Date Value   09/27/2024 115 (H)      Potassium (mEq/L)   Date Value   09/27/2024 4.5    Creatinine (mg/dL)   Date Value   09/27/2024 1.10         Hyperlipidemia:  No new myalgias or GI upset on simvastatin (Zocor).     Lab Results   Component Value Date    CHOL 126 03/05/2024    TRIG 69 03/05/2024    HDL 49 03/05/2024     Lab Results   Component Value Date    ALT 16 09/27/2024    AST 22 09/27/2024          BPH:  Doing ok on proscar and uroxatral      Past Medical History:   Diagnosis Date    Hyperlipidemia     Hypertension, well controlled      Past Surgical History:   Procedure Laterality Date    CATARACT REMOVAL  5/2012    bilateral    COLONOSCOPY  7/23/2008     History reviewed. No pertinent family history.  Social History     Socioeconomic History    Marital status:      Spouse name: None    Number of children: None

## 2024-10-01 ASSESSMENT — ENCOUNTER SYMPTOMS
EYE PAIN: 0
DIARRHEA: 0
CHEST TIGHTNESS: 0
CONSTIPATION: 0
TROUBLE SWALLOWING: 0
SHORTNESS OF BREATH: 0
COUGH: 0
ABDOMINAL PAIN: 0

## 2024-10-04 ENCOUNTER — TELEPHONE (OUTPATIENT)
Dept: FAMILY MEDICINE CLINIC | Age: 89
End: 2024-10-04

## 2024-10-04 NOTE — TELEPHONE ENCOUNTER
Daughter is calling to see if pt can get an order for a Rolator walker with a seat thru [facility] because they were looking at them and the pharmacy told them that if his doctor could write an order his insurance would cover it.

## 2024-10-08 NOTE — TELEPHONE ENCOUNTER
Daughter is asking that this be completed asap, pt will be moving into an assisted living and would like to have this. Please advise.

## 2024-10-10 RX ORDER — ACETAMINOPHEN 500 MG
500 TABLET ORAL DAILY PRN
COMMUNITY

## 2024-10-29 ENCOUNTER — TELEMEDICINE (OUTPATIENT)
Dept: FAMILY MEDICINE CLINIC | Age: 89
End: 2024-10-29
Payer: COMMERCIAL

## 2024-10-29 DIAGNOSIS — Z00.00 MEDICARE ANNUAL WELLNESS VISIT, SUBSEQUENT: Primary | ICD-10-CM

## 2024-10-29 PROCEDURE — G0439 PPPS, SUBSEQ VISIT: HCPCS | Performed by: NURSE PRACTITIONER

## 2024-10-29 PROCEDURE — G8484 FLU IMMUNIZE NO ADMIN: HCPCS | Performed by: NURSE PRACTITIONER

## 2024-10-29 PROCEDURE — 1123F ACP DISCUSS/DSCN MKR DOCD: CPT | Performed by: NURSE PRACTITIONER

## 2024-10-29 ASSESSMENT — PATIENT HEALTH QUESTIONNAIRE - PHQ9
SUM OF ALL RESPONSES TO PHQ QUESTIONS 1-9: 0
SUM OF ALL RESPONSES TO PHQ9 QUESTIONS 1 & 2: 0
2. FEELING DOWN, DEPRESSED OR HOPELESS: NOT AT ALL
1. LITTLE INTEREST OR PLEASURE IN DOING THINGS: NOT AT ALL
SUM OF ALL RESPONSES TO PHQ QUESTIONS 1-9: 0

## 2024-10-29 ASSESSMENT — LIFESTYLE VARIABLES
HOW OFTEN DO YOU HAVE A DRINK CONTAINING ALCOHOL: NEVER
HOW MANY STANDARD DRINKS CONTAINING ALCOHOL DO YOU HAVE ON A TYPICAL DAY: PATIENT DOES NOT DRINK

## 2024-10-29 NOTE — PROGRESS NOTES
Medicare Annual Wellness Visit    Polo Rodriguez is here for Medicare AWV    Assessment & Plan   Medicare annual wellness visit, subsequent  Recommendations for Preventive Services Due: see orders and patient instructions/AVS.  Recommended screening schedule for the next 5-10 years is provided to the patient in written form: see Patient Instructions/AVS.     No follow-ups on file.     Subjective       Patient's complete Health Risk Assessment and screening values have been reviewed and are found in Flowsheets. The following problems were reviewed today and where indicated follow up appointments were made and/or referrals ordered.    Positive Risk Factor Screenings with Interventions:    Fall Risk:  Do you feel unsteady or are you worried about falling? : no (got a walker)  2 or more falls in past year?: no  Fall with injury in past year?: (!) yes     Interventions:    Reviewed medications, home hazards, visual acuity, and co-morbidities that can increase risk for falls             Inactivity:  On average, how many days per week do you engage in moderate to strenuous exercise (like a brisk walk)?: 2 days (!) Abnormal  On average, how many minutes do you engage in exercise at this level?: 10 min  Interventions:  Patient comments: He has pain in his legs   See AVS for additional education material        Vision Screen:  Do you have difficulty driving, watching TV, or doing any of your daily activities because of your eyesight?: No  Have you had an eye exam within the past year?: (!) No  Interventions:   Patient encouraged to make appointment with their eye specialist     ADL's:   Patient reports needing help with:  Select all that apply: (!) Food Preparation, Transportation, Housekeeping, Laundry  Interventions:  Patient comments: He is now living in Assisted Living with his wife.                   Objective    Patient-Reported Vitals  No data recorded             No Known Allergies  Prior to Visit Medications

## 2024-10-29 NOTE — PATIENT INSTRUCTIONS
attack. These may include:    Chest pain or pressure, or a strange feeling in the chest.     Sweating.     Shortness of breath.     Pain, pressure, or a strange feeling in the back, neck, jaw, or upper belly or in one or both shoulders or arms.     Lightheadedness or sudden weakness.     A fast or irregular heartbeat.   After you call 911, the  may tell you to chew 1 adult-strength or 2 to 4 low-dose aspirin. Wait for an ambulance. Do not try to drive yourself.  Watch closely for changes in your health, and be sure to contact your doctor if you have any problems.  Where can you learn more?  Go to https://www.EmbedStore.net/patientEd and enter F075 to learn more about \"A Healthy Heart: Care Instructions.\"  Current as of: June 24, 2023  Content Version: 14.2  © 2024 [employer].   Care instructions adapted under license by [employer]. If you have questions about a medical condition or this instruction, always ask your healthcare professional. [employer] disclaims any warranty or liability for your use of this information.      Personalized Preventive Plan for Polo Rodriguez - 10/29/2024  Medicare offers a range of preventive health benefits. Some of the tests and screenings are paid in full while other may be subject to a deductible, co-insurance, and/or copay.    Some of these benefits include a comprehensive review of your medical history including lifestyle, illnesses that may run in your family, and various assessments and screenings as appropriate.    After reviewing your medical record and screening and assessments performed today your provider may have ordered immunizations, labs, imaging, and/or referrals for you.  A list of these orders (if applicable) as well as your Preventive Care list are included within your After Visit Summary for your review.    Other Preventive Recommendations:    A preventive eye exam performed by an eye specialist is recommended every 1-2 years

## 2024-12-23 DIAGNOSIS — I10 ESSENTIAL HYPERTENSION: ICD-10-CM

## 2024-12-23 RX ORDER — METOPROLOL SUCCINATE 25 MG/1
TABLET, EXTENDED RELEASE ORAL
Qty: 30 TABLET | Refills: 5 | Status: SHIPPED | OUTPATIENT
Start: 2024-12-23

## 2024-12-23 NOTE — TELEPHONE ENCOUNTER
Comments:     Last Office Visit (last PCP visit):   9/30/2024    Next Visit Date:  Future Appointments   Date Time Provider Department Center   3/7/2025  8:30 AM Jerod Alford MD [facility]       **If hasn't been seen in over a year OR hasn't followed up according to last diabetes/ADHD visit, make appointment for patient before sending refill to provider.    Rx requested:  Requested Prescriptions     Pending Prescriptions Disp Refills    metoprolol succinate (TOPROL XL) 25 MG extended release tablet 30 tablet      Sig: TAKE 1 TABLET DAILY

## 2024-12-28 ENCOUNTER — APPOINTMENT (OUTPATIENT)
Dept: GENERAL RADIOLOGY | Age: 88
End: 2024-12-28
Payer: MEDICARE

## 2024-12-28 ENCOUNTER — APPOINTMENT (OUTPATIENT)
Dept: CT IMAGING | Age: 88
End: 2024-12-28
Payer: MEDICARE

## 2024-12-28 ENCOUNTER — HOSPITAL ENCOUNTER (EMERGENCY)
Age: 88
Discharge: HOME | End: 2024-12-28
Attending: EMERGENCY MEDICINE
Payer: MEDICARE

## 2024-12-28 VITALS
HEART RATE: 67 BPM | HEIGHT: 70 IN | DIASTOLIC BLOOD PRESSURE: 75 MMHG | SYSTOLIC BLOOD PRESSURE: 179 MMHG | TEMPERATURE: 97.7 F | BODY MASS INDEX: 22.05 KG/M2 | OXYGEN SATURATION: 97 % | WEIGHT: 154 LBS | RESPIRATION RATE: 16 BRPM

## 2024-12-28 DIAGNOSIS — I16.0 HYPERTENSIVE URGENCY: Primary | ICD-10-CM

## 2024-12-28 LAB
ALBUMIN SERPL-MCNC: 3.7 G/DL (ref 3.5–4.6)
ALP SERPL-CCNC: 141 U/L (ref 35–104)
ALT SERPL-CCNC: 21 U/L (ref 0–41)
ANION GAP SERPL CALCULATED.3IONS-SCNC: 11 MEQ/L (ref 9–15)
AST SERPL-CCNC: 29 U/L (ref 0–40)
BASOPHILS # BLD: 0 K/UL (ref 0–0.2)
BASOPHILS NFR BLD: 0.5 %
BILIRUB SERPL-MCNC: 0.3 MG/DL (ref 0.2–0.7)
BUN SERPL-MCNC: 30 MG/DL (ref 8–23)
CALCIUM SERPL-MCNC: 9.2 MG/DL (ref 8.5–9.9)
CHLORIDE SERPL-SCNC: 104 MEQ/L (ref 95–107)
CO2 SERPL-SCNC: 26 MEQ/L (ref 20–31)
CREAT SERPL-MCNC: 1.18 MG/DL (ref 0.7–1.2)
EKG ATRIAL RATE: 70 BPM
EKG P AXIS: 0 DEGREES
EKG P-R INTERVAL: 282 MS
EKG Q-T INTERVAL: 406 MS
EKG QRS DURATION: 84 MS
EKG QTC CALCULATION (BAZETT): 438 MS
EKG R AXIS: -23 DEGREES
EKG T AXIS: 34 DEGREES
EKG VENTRICULAR RATE: 70 BPM
EOSINOPHIL # BLD: 0.1 K/UL (ref 0–0.7)
EOSINOPHIL NFR BLD: 1.2 %
ERYTHROCYTE [DISTWIDTH] IN BLOOD BY AUTOMATED COUNT: 12.8 % (ref 11.5–14.5)
GLOBULIN SER CALC-MCNC: 3.1 G/DL (ref 2.3–3.5)
GLUCOSE SERPL-MCNC: 148 MG/DL (ref 70–99)
HCT VFR BLD AUTO: 39.9 % (ref 42–52)
HGB BLD-MCNC: 12.9 G/DL (ref 14–18)
LYMPHOCYTES # BLD: 1.3 K/UL (ref 1–4.8)
LYMPHOCYTES NFR BLD: 17.5 %
MAGNESIUM SERPL-MCNC: 1.8 MG/DL (ref 1.7–2.4)
MCH RBC QN AUTO: 31.3 PG (ref 27–31.3)
MCHC RBC AUTO-ENTMCNC: 32.3 % (ref 33–37)
MCV RBC AUTO: 96.8 FL (ref 79–92.2)
MONOCYTES # BLD: 0.8 K/UL (ref 0.2–0.8)
MONOCYTES NFR BLD: 10.1 %
NEUTROPHILS # BLD: 5.3 K/UL (ref 1.4–6.5)
NEUTS SEG NFR BLD: 70.4 %
PLATELET # BLD AUTO: 199 K/UL (ref 130–400)
POTASSIUM SERPL-SCNC: 4 MEQ/L (ref 3.4–4.9)
PROT SERPL-MCNC: 6.8 G/DL (ref 6.3–8)
RBC # BLD AUTO: 4.12 M/UL (ref 4.7–6.1)
SODIUM SERPL-SCNC: 141 MEQ/L (ref 135–144)
TROPONIN, HIGH SENSITIVITY: 28 NG/L (ref 0–19)
TROPONIN, HIGH SENSITIVITY: 34 NG/L (ref 0–19)
WBC # BLD AUTO: 7.5 K/UL (ref 4.8–10.8)

## 2024-12-28 PROCEDURE — 99285 EMERGENCY DEPT VISIT HI MDM: CPT

## 2024-12-28 PROCEDURE — 80053 COMPREHEN METABOLIC PANEL: CPT

## 2024-12-28 PROCEDURE — 70450 CT HEAD/BRAIN W/O DYE: CPT

## 2024-12-28 PROCEDURE — 6360000002 HC RX W HCPCS: Performed by: EMERGENCY MEDICINE

## 2024-12-28 PROCEDURE — 83735 ASSAY OF MAGNESIUM: CPT

## 2024-12-28 PROCEDURE — 96374 THER/PROPH/DIAG INJ IV PUSH: CPT

## 2024-12-28 PROCEDURE — 84484 ASSAY OF TROPONIN QUANT: CPT

## 2024-12-28 PROCEDURE — 85025 COMPLETE CBC W/AUTO DIFF WBC: CPT

## 2024-12-28 PROCEDURE — 71045 X-RAY EXAM CHEST 1 VIEW: CPT

## 2024-12-28 RX ORDER — HYDRALAZINE HYDROCHLORIDE 10 MG/1
10 TABLET, FILM COATED ORAL 2 TIMES DAILY
Qty: 28 TABLET | Refills: 3 | Status: ON HOLD | OUTPATIENT
Start: 2024-12-28 | End: 2025-01-02 | Stop reason: HOSPADM

## 2024-12-28 RX ORDER — HYDRALAZINE HYDROCHLORIDE 20 MG/ML
10 INJECTION INTRAMUSCULAR; INTRAVENOUS ONCE
Status: COMPLETED | OUTPATIENT
Start: 2024-12-28 | End: 2024-12-28

## 2024-12-28 RX ADMIN — HYDRALAZINE HYDROCHLORIDE 10 MG: 20 INJECTION INTRAMUSCULAR; INTRAVENOUS at 15:30

## 2024-12-28 ASSESSMENT — ENCOUNTER SYMPTOMS
RHINORRHEA: 0
ABDOMINAL PAIN: 0
TROUBLE SWALLOWING: 0
NAUSEA: 0
SHORTNESS OF BREATH: 0
VOMITING: 0
ALLERGIC/IMMUNOLOGIC NEGATIVE: 1
WHEEZING: 0
EYES NEGATIVE: 1

## 2024-12-28 ASSESSMENT — PAIN - FUNCTIONAL ASSESSMENT: PAIN_FUNCTIONAL_ASSESSMENT: NONE - DENIES PAIN

## 2024-12-28 NOTE — ED TRIAGE NOTES
Pt is from [facility] assisted living, he took his blood pressure and noticed it was elevated. Pt then had the nurse take it and it remained elevated. Pt states he did take his medication today.  Pt was brought in by family. Pt admits to feeling a little bit light headed

## 2024-12-28 NOTE — ED PROVIDER NOTES
[facility] ED  eMERGENCY dEPARTMENT eNCOUnter      Pt Name: Polo Rodriguez  MRN: [id number]  Birthdate 6/2/1932  Date of evaluation: 12/28/2024  Provider: Alberto Cárdenas MD    CHIEF COMPLAINT       Chief Complaint   Patient presents with    Hypertension     From [facility]         HISTORY OF PRESENT ILLNESS   (Location/Symptom, Timing/Onset,Context/Setting, Quality, Duration, Modifying Factors, Severity)  Note limiting factors.   Polo Rodriguez is a 89 y.o. male who presents to the emergency department for complaint of hypertensive urgency.  Patient notes that he was not quite feeling himself today.  Patient did have his blood pressure taken, at his local assisted living center.  It was discovered to be in the 200s.  Patient was referred to the emergency department for evaluation.  Patient denies any complaint of cephalgia, chest pain, shortness of breath, or other constitutional symptomatologies.    HPI    NursingNotes were reviewed.    REVIEW OF SYSTEMS    (2-9 systems for level 4, 10 or more for level 5)     Review of Systems   Constitutional:  Negative for activity change, chills and fever.        This kind patient admits he does not feel himself, but does not have specific complaint at this time.  Patient was out and about today, had breakfast/lunch with son-in-law.  Has been busy and transitioning from his house to his assisted living.  Has no active complaint.   HENT:  Negative for congestion, ear pain, rhinorrhea and trouble swallowing.    Eyes: Negative.    Respiratory:  Negative for shortness of breath and wheezing.    Cardiovascular:  Negative for chest pain and leg swelling.   Gastrointestinal:  Negative for abdominal pain, nausea and vomiting.   Endocrine: Negative.    Genitourinary:  Negative for dysuria, frequency and hematuria.   Musculoskeletal:  Negative for gait problem and neck pain.   Skin: Negative.    Allergic/Immunologic: Negative.    Neurological:  Negative for seizures, syncope and  intracranial findings.               ED BEDSIDE ULTRASOUND:   Performed by ED Physician - none    LABS:  Labs Reviewed   CBC WITH AUTO DIFFERENTIAL - Abnormal; Notable for the following components:       Result Value    RBC 4.12 (*)     Hemoglobin 12.9 (*)     Hematocrit 39.9 (*)     MCV 96.8 (*)     MCHC 32.3 (*)     All other components within normal limits   COMPREHENSIVE METABOLIC PANEL - Abnormal; Notable for the following components:    Glucose 148 (*)     BUN 30 (*)     Est, Glom Filt Rate 57.7 (*)     Alkaline Phosphatase 141 (*)     All other components within normal limits   TROPONIN - Abnormal; Notable for the following components:    Troponin, High Sensitivity 34 (*)     All other components within normal limits   TROPONIN - Abnormal; Notable for the following components:    Troponin, High Sensitivity 28 (*)     All other components within normal limits   MAGNESIUM       All other labs were within normal range or not returned as of this dictation.    EMERGENCY DEPARTMENT COURSE and DIFFERENTIAL DIAGNOSIS/MDM:   Vitals:    Vitals:    12/28/24 1530 12/28/24 1550 12/28/24 1600 12/28/24 1630   BP: (!) 197/75 (!) 175/68 (!) 174/63 (!) 179/75   Pulse: 84 68 71 67   Resp: 17 19 19 16   Temp:       TempSrc:       SpO2: 96% 96% 96% 97%   Weight:       Height:           This kind patient did well in the emergency department.  No specific symptoms, and remaining resolved status post hydralazine dosing.  Patient was at a high of 213/83.  We did drop his blood pressure to approximately 175 and holding.  Recommend adding hydralazine twice daily 10 mg.  Recommend close outpatient follow-up with Dr. Alvarez.    At this time patient's laboratory diagnostic evaluation is largely unremarkable.  Patient's EKG was nonacute.  Chest x-ray and brain scan within normal limits.  Patient's troponin did not show any upward trend is felt to be at baseline.    Advised to return should condition worsening capacity.  Prescription has

## 2024-12-31 ENCOUNTER — HOSPITAL ENCOUNTER (INPATIENT)
Age: 88
LOS: 1 days | Discharge: HOME | DRG: 305 | End: 2025-01-02
Admitting: STUDENT IN AN ORGANIZED HEALTH CARE EDUCATION/TRAINING PROGRAM
Payer: MEDICARE

## 2024-12-31 ENCOUNTER — APPOINTMENT (OUTPATIENT)
Dept: GENERAL RADIOLOGY | Age: 88
DRG: 305 | End: 2024-12-31
Payer: MEDICARE

## 2024-12-31 ENCOUNTER — APPOINTMENT (OUTPATIENT)
Dept: CT IMAGING | Age: 88
DRG: 305 | End: 2024-12-31
Payer: MEDICARE

## 2024-12-31 DIAGNOSIS — R07.9 CHEST PAIN, UNSPECIFIED TYPE: Primary | ICD-10-CM

## 2024-12-31 LAB
ALBUMIN SERPL-MCNC: 3.7 G/DL (ref 3.5–4.6)
ALP SERPL-CCNC: 140 U/L (ref 35–104)
ALT SERPL-CCNC: 19 U/L (ref 0–41)
ANION GAP SERPL CALCULATED.3IONS-SCNC: 13 MEQ/L (ref 9–15)
AST SERPL-CCNC: 29 U/L (ref 0–40)
BASOPHILS # BLD: 0 K/UL (ref 0–0.2)
BASOPHILS NFR BLD: 0.6 %
BILIRUB SERPL-MCNC: 0.3 MG/DL (ref 0.2–0.7)
BUN SERPL-MCNC: 29 MG/DL (ref 8–23)
CALCIUM SERPL-MCNC: 9.1 MG/DL (ref 8.5–9.9)
CHLORIDE SERPL-SCNC: 101 MEQ/L (ref 95–107)
CO2 SERPL-SCNC: 23 MEQ/L (ref 20–31)
CREAT SERPL-MCNC: 1.06 MG/DL (ref 0.7–1.2)
EOSINOPHIL # BLD: 0.2 K/UL (ref 0–0.7)
EOSINOPHIL NFR BLD: 3.3 %
ERYTHROCYTE [DISTWIDTH] IN BLOOD BY AUTOMATED COUNT: 12.9 % (ref 11.5–14.5)
GLOBULIN SER CALC-MCNC: 3.1 G/DL (ref 2.3–3.5)
GLUCOSE SERPL-MCNC: 130 MG/DL (ref 70–99)
HCT VFR BLD AUTO: 39.3 % (ref 42–52)
HGB BLD-MCNC: 13.2 G/DL (ref 14–18)
LYMPHOCYTES # BLD: 1.8 K/UL (ref 1–4.8)
LYMPHOCYTES NFR BLD: 25.2 %
MAGNESIUM SERPL-MCNC: 1.8 MG/DL (ref 1.7–2.4)
MCH RBC QN AUTO: 32.8 PG (ref 27–31.3)
MCHC RBC AUTO-ENTMCNC: 33.6 % (ref 33–37)
MCV RBC AUTO: 97.5 FL (ref 79–92.2)
MONOCYTES # BLD: 0.9 K/UL (ref 0.2–0.8)
MONOCYTES NFR BLD: 12.2 %
NEUTROPHILS # BLD: 4.1 K/UL (ref 1.4–6.5)
NEUTS SEG NFR BLD: 58.3 %
PLATELET # BLD AUTO: 185 K/UL (ref 130–400)
POTASSIUM SERPL-SCNC: 3.7 MEQ/L (ref 3.4–4.9)
PROT SERPL-MCNC: 6.8 G/DL (ref 6.3–8)
RBC # BLD AUTO: 4.03 M/UL (ref 4.7–6.1)
REJECTED TEST: NORMAL
SODIUM SERPL-SCNC: 137 MEQ/L (ref 135–144)
TROPONIN, HIGH SENSITIVITY: 31 NG/L (ref 0–19)
TROPONIN, HIGH SENSITIVITY: 31 NG/L (ref 0–19)
TROPONIN, HIGH SENSITIVITY: 38 NG/L (ref 0–19)
WBC # BLD AUTO: 7.1 K/UL (ref 4.8–10.8)

## 2024-12-31 PROCEDURE — 84484 ASSAY OF TROPONIN QUANT: CPT

## 2024-12-31 PROCEDURE — 6370000000 HC RX 637 (ALT 250 FOR IP): Performed by: INTERNAL MEDICINE

## 2024-12-31 PROCEDURE — 85025 COMPLETE CBC W/AUTO DIFF WBC: CPT

## 2024-12-31 PROCEDURE — 71045 X-RAY EXAM CHEST 1 VIEW: CPT

## 2024-12-31 PROCEDURE — G0378 HOSPITAL OBSERVATION PER HR: HCPCS

## 2024-12-31 PROCEDURE — 80053 COMPREHEN METABOLIC PANEL: CPT

## 2024-12-31 PROCEDURE — 99285 EMERGENCY DEPT VISIT HI MDM: CPT

## 2024-12-31 PROCEDURE — G0378 HOSPITAL OBSERVATION PER HR: HCPCS | Performed by: STUDENT IN AN ORGANIZED HEALTH CARE EDUCATION/TRAINING PROGRAM

## 2024-12-31 PROCEDURE — 2580000003 HC RX 258

## 2024-12-31 PROCEDURE — 6360000002 HC RX W HCPCS

## 2024-12-31 PROCEDURE — 2500000003 HC RX 250 WO HCPCS

## 2024-12-31 PROCEDURE — 6370000000 HC RX 637 (ALT 250 FOR IP)

## 2024-12-31 PROCEDURE — 70450 CT HEAD/BRAIN W/O DYE: CPT

## 2024-12-31 PROCEDURE — 36415 COLL VENOUS BLD VENIPUNCTURE: CPT

## 2024-12-31 PROCEDURE — 93005 ELECTROCARDIOGRAM TRACING: CPT

## 2024-12-31 PROCEDURE — 74176 CT ABD & PELVIS W/O CONTRAST: CPT

## 2024-12-31 PROCEDURE — 96374 THER/PROPH/DIAG INJ IV PUSH: CPT

## 2024-12-31 PROCEDURE — 83735 ASSAY OF MAGNESIUM: CPT

## 2024-12-31 PROCEDURE — APPSS45 APP SPLIT SHARED TIME 31-45 MINUTES

## 2024-12-31 PROCEDURE — 99223 1ST HOSP IP/OBS HIGH 75: CPT | Performed by: INTERNAL MEDICINE

## 2024-12-31 RX ORDER — TAMSULOSIN HYDROCHLORIDE 0.4 MG/1
0.4 CAPSULE ORAL DAILY
Status: DISCONTINUED | OUTPATIENT
Start: 2024-12-31 | End: 2025-01-02 | Stop reason: HOSPADM

## 2024-12-31 RX ORDER — ASPIRIN 81 MG/1
81 TABLET, CHEWABLE ORAL DAILY
Status: DISCONTINUED | OUTPATIENT
Start: 2024-12-31 | End: 2025-01-02 | Stop reason: HOSPADM

## 2024-12-31 RX ORDER — ONDANSETRON 2 MG/ML
4 INJECTION INTRAMUSCULAR; INTRAVENOUS EVERY 6 HOURS PRN
Status: DISCONTINUED | OUTPATIENT
Start: 2024-12-31 | End: 2025-01-02 | Stop reason: HOSPADM

## 2024-12-31 RX ORDER — POTASSIUM CHLORIDE 1500 MG/1
40 TABLET, EXTENDED RELEASE ORAL PRN
Status: DISCONTINUED | OUTPATIENT
Start: 2024-12-31 | End: 2025-01-02 | Stop reason: HOSPADM

## 2024-12-31 RX ORDER — ONDANSETRON 4 MG/1
4 TABLET, ORALLY DISINTEGRATING ORAL EVERY 8 HOURS PRN
Status: DISCONTINUED | OUTPATIENT
Start: 2024-12-31 | End: 2025-01-02 | Stop reason: HOSPADM

## 2024-12-31 RX ORDER — ATORVASTATIN CALCIUM 10 MG/1
10 TABLET, FILM COATED ORAL DAILY
Status: DISCONTINUED | OUTPATIENT
Start: 2024-12-31 | End: 2025-01-02 | Stop reason: HOSPADM

## 2024-12-31 RX ORDER — HYDRALAZINE HYDROCHLORIDE 10 MG/1
10 TABLET, FILM COATED ORAL EVERY 12 HOURS SCHEDULED
Status: DISCONTINUED | OUTPATIENT
Start: 2024-12-31 | End: 2024-12-31

## 2024-12-31 RX ORDER — ACETAMINOPHEN 325 MG/1
650 TABLET ORAL EVERY 6 HOURS PRN
Status: DISCONTINUED | OUTPATIENT
Start: 2024-12-31 | End: 2025-01-02 | Stop reason: HOSPADM

## 2024-12-31 RX ORDER — LOSARTAN POTASSIUM 25 MG/1
25 TABLET ORAL DAILY
Status: DISCONTINUED | OUTPATIENT
Start: 2024-12-31 | End: 2025-01-01

## 2024-12-31 RX ORDER — HYDRALAZINE HYDROCHLORIDE 20 MG/ML
10 INJECTION INTRAMUSCULAR; INTRAVENOUS ONCE
Status: COMPLETED | OUTPATIENT
Start: 2024-12-31 | End: 2024-12-31

## 2024-12-31 RX ORDER — SODIUM CHLORIDE 0.9 % (FLUSH) 0.9 %
5-40 SYRINGE (ML) INJECTION PRN
Status: DISCONTINUED | OUTPATIENT
Start: 2024-12-31 | End: 2025-01-02 | Stop reason: HOSPADM

## 2024-12-31 RX ORDER — 0.9 % SODIUM CHLORIDE 0.9 %
250 INTRAVENOUS SOLUTION INTRAVENOUS ONCE
Status: COMPLETED | OUTPATIENT
Start: 2024-12-31 | End: 2024-12-31

## 2024-12-31 RX ORDER — SODIUM CHLORIDE 9 MG/ML
INJECTION, SOLUTION INTRAVENOUS PRN
Status: DISCONTINUED | OUTPATIENT
Start: 2024-12-31 | End: 2025-01-02 | Stop reason: HOSPADM

## 2024-12-31 RX ORDER — ENOXAPARIN SODIUM 100 MG/ML
40 INJECTION SUBCUTANEOUS DAILY
Status: DISCONTINUED | OUTPATIENT
Start: 2024-12-31 | End: 2025-01-02 | Stop reason: HOSPADM

## 2024-12-31 RX ORDER — METOPROLOL TARTRATE 25 MG/1
25 TABLET, FILM COATED ORAL EVERY MORNING
Status: DISCONTINUED | OUTPATIENT
Start: 2024-12-31 | End: 2025-01-01

## 2024-12-31 RX ORDER — POTASSIUM CHLORIDE 7.45 MG/ML
10 INJECTION INTRAVENOUS PRN
Status: DISCONTINUED | OUTPATIENT
Start: 2024-12-31 | End: 2025-01-02 | Stop reason: HOSPADM

## 2024-12-31 RX ORDER — MAGNESIUM SULFATE IN WATER 40 MG/ML
2000 INJECTION, SOLUTION INTRAVENOUS PRN
Status: DISCONTINUED | OUTPATIENT
Start: 2024-12-31 | End: 2025-01-02 | Stop reason: HOSPADM

## 2024-12-31 RX ORDER — ACETAMINOPHEN 650 MG/1
650 SUPPOSITORY RECTAL EVERY 6 HOURS PRN
Status: DISCONTINUED | OUTPATIENT
Start: 2024-12-31 | End: 2025-01-02 | Stop reason: HOSPADM

## 2024-12-31 RX ORDER — POLYETHYLENE GLYCOL 3350 17 G/17G
17 POWDER, FOR SOLUTION ORAL DAILY PRN
Status: DISCONTINUED | OUTPATIENT
Start: 2024-12-31 | End: 2025-01-02 | Stop reason: HOSPADM

## 2024-12-31 RX ORDER — SODIUM CHLORIDE 0.9 % (FLUSH) 0.9 %
5-40 SYRINGE (ML) INJECTION EVERY 12 HOURS SCHEDULED
Status: DISCONTINUED | OUTPATIENT
Start: 2024-12-31 | End: 2025-01-02 | Stop reason: HOSPADM

## 2024-12-31 RX ORDER — FINASTERIDE 5 MG/1
5 TABLET, FILM COATED ORAL DAILY
Status: DISCONTINUED | OUTPATIENT
Start: 2024-12-31 | End: 2025-01-02 | Stop reason: HOSPADM

## 2024-12-31 RX ADMIN — METOPROLOL TARTRATE 25 MG: 25 TABLET, FILM COATED ORAL at 08:46

## 2024-12-31 RX ADMIN — FINASTERIDE 5 MG: 5 TABLET, FILM COATED ORAL at 08:46

## 2024-12-31 RX ADMIN — ENOXAPARIN SODIUM 40 MG: 100 INJECTION SUBCUTANEOUS at 08:46

## 2024-12-31 RX ADMIN — TAMSULOSIN HYDROCHLORIDE 0.4 MG: 0.4 CAPSULE ORAL at 08:46

## 2024-12-31 RX ADMIN — ASPIRIN 81 MG: 81 TABLET, CHEWABLE ORAL at 08:46

## 2024-12-31 RX ADMIN — LOSARTAN POTASSIUM 25 MG: 25 TABLET, FILM COATED ORAL at 20:15

## 2024-12-31 RX ADMIN — ATORVASTATIN CALCIUM 10 MG: 10 TABLET, FILM COATED ORAL at 08:46

## 2024-12-31 RX ADMIN — SODIUM CHLORIDE, PRESERVATIVE FREE 5 ML: 5 INJECTION INTRAVENOUS at 08:46

## 2024-12-31 RX ADMIN — HYDRALAZINE HYDROCHLORIDE 10 MG: 20 INJECTION INTRAMUSCULAR; INTRAVENOUS at 01:39

## 2024-12-31 RX ADMIN — SODIUM CHLORIDE 250 ML: 9 INJECTION, SOLUTION INTRAVENOUS at 01:40

## 2024-12-31 RX ADMIN — SODIUM CHLORIDE, PRESERVATIVE FREE 10 ML: 5 INJECTION INTRAVENOUS at 20:15

## 2024-12-31 ASSESSMENT — PAIN - FUNCTIONAL ASSESSMENT: PAIN_FUNCTIONAL_ASSESSMENT: NONE - DENIES PAIN

## 2024-12-31 ASSESSMENT — ENCOUNTER SYMPTOMS
VOMITING: 0
NAUSEA: 1
CHEST TIGHTNESS: 0
DIARRHEA: 0
COUGH: 0
PHOTOPHOBIA: 0
CONSTIPATION: 0
ABDOMINAL PAIN: 0
WHEEZING: 0
SHORTNESS OF BREATH: 0

## 2024-12-31 ASSESSMENT — LIFESTYLE VARIABLES
HOW MANY STANDARD DRINKS CONTAINING ALCOHOL DO YOU HAVE ON A TYPICAL DAY: PATIENT DOES NOT DRINK
HOW OFTEN DO YOU HAVE A DRINK CONTAINING ALCOHOL: NEVER
HOW OFTEN DO YOU HAVE A DRINK CONTAINING ALCOHOL: NEVER
HOW MANY STANDARD DRINKS CONTAINING ALCOHOL DO YOU HAVE ON A TYPICAL DAY: PATIENT DOES NOT DRINK

## 2024-12-31 ASSESSMENT — HEART SCORE: ECG: NORMAL

## 2024-12-31 NOTE — CONSULTS
DATE OF CONSULTATION  12/31/2024    CONSULTANT  Andriy Sierra PA-C     REQUESTING PHYSICIAN  Mark Whalen MD     PRIMARY CARDIOLOGIST      REASON FOR CONSULTATION  Chief Complaint   Patient presents with    Chest Pain       Hospital Day: 0       Patient is a 89 y.o. male with pmhx of HTN, HLD who presents with a chief complaint of chest pain. Patient is followed on a regular basis by Jerod Trujillo MD.     Patient states that he started to notice some left sided chest pain last night around 11 pm. States that it felt different and describes it as some pressure. Rates the pain a 2-3/10. Associated symptoms include lightheadedness and nausea. Denies any radiation of pain to his shoulder, arm, back or jaw. He further denies any LOC, syncope, blurry vision, emesis, abdominal pain, diarrhea, constipation or leg edema.   Daughter at bedside. States that patient lives in assisted living facility with his wife. They are currently trying to sell his house. He takes care of his wife and does not have time to do anything else. States that he has constant worry and stress about his wife. Patient reports that there has been increased stress given new living arrangements and selling his house.   He denies any previous history of MI's, cardiac caths.     During time of examination patient was feeling lightheaded but no chest pain, sob or other symptoms. He states that he checks his BP at home but vary.   He follows with Dr. Alford.     Patient was recently seen in the ER for hypertensive urgency and was started on hydralazine, which he took on Saturday for the first time.     Patient has a 10 year history of smoking but denies any current cigarette/alcohol or drug use.    Troponin: 31,31  EKG: NSR with 1st degree AV block at 73 bpm    Past Medical History:   Diagnosis Date    Hyperlipidemia     Hypertension, well controlled     Melanoma in situ of cheek (HCC)       Patient Active Problem List   Diagnosis

## 2024-12-31 NOTE — ED PROVIDER NOTES
< 1X normal limit  Heart Score Total: 3               CIWA Assessment  BP: (!) 143/60  Pulse: 66                 PHYSICAL EXAM    (up to 7 for level 4, 8 or more for level 5)     ED Triage Vitals   BP Systolic BP Percentile Diastolic BP Percentile Temp Temp src Pulse Respirations SpO2   12/31/24 0027 -- -- 12/31/24 0026 -- 12/31/24 0027 12/31/24 0027 12/31/24 0027   (!) 203/80   98.9 °F (37.2 °C)  74 21 96 %      Height Weight - Scale         12/31/24 0026 12/31/24 0026         1.778 m (5' 10\") 68 kg (150 lb)             Physical Exam    DIAGNOSTIC RESULTS     EKG: All EKG's are interpreted by the Emergency Department Physician who either signs or Co-signs this chart in the absence of a cardiologist.    EKG my interpretation: Narrow QRS complex, normal sinus rhythm, 73 bpm no STEMI, QTc 442ms    RADIOLOGY:   Non-plain film images such as CT, Ultrasound and MRI are read by the radiologist. Plain radiographic images are visualized and preliminarily interpreted by the emergency physician with the below findings:    Chest x-ray interpretation: Normal heart size, lung markings present throughout, no focal consolidations, no free air, no pleural effusions, no significant change from previous 12/28/2024    Interpretation per the Radiologist below, if available at the time of this note:    CT HEAD WO CONTRAST   Final Result   No acute intracranial abnormality.         CT ABDOMEN PELVIS WO CONTRAST Additional Contrast? None   Final Result   1. No acute intra-abdominal or intrapelvic process.   2. Small hiatal hernia.   3. Diverticulosis.   4. Distended urinary bladder.         XR CHEST PORTABLE   Final Result   No acute process.               ED BEDSIDE ULTRASOUND:   Performed by ED Physician - none    LABS:  Labs Reviewed   CBC WITH AUTO DIFFERENTIAL - Abnormal; Notable for the following components:       Result Value    RBC 4.03 (*)     Hemoglobin 13.2 (*)     Hematocrit 39.3 (*)     MCV 97.5 (*)     MCH 32.8 (*)

## 2024-12-31 NOTE — FLOWSHEET NOTE
1404; Patient admitted from ER, patient alert and oriented times 4, daughter at bedside. Patient medication reconciled and confirmed with daughter as well. Oriented to room and call light light, skin assessed by 2 nurses, intact. Patient call light in reach. Will continue to monitor throughout this shift.   1430: Cardiology at bedside for new consult. .Electronically signed by Helen Valladares RN on 12/31/2024 at 2:59 PM

## 2024-12-31 NOTE — ED NOTES
Daughter Calli is in town and would like updates, pt other children are out of town at the moment. [phone]

## 2024-12-31 NOTE — H&P
DEPARTMENT OF HOSPITAL MEDICINE    HISTORY AND PHYSICAL EXAM    PATIENT NAME:  Polo Rodriguez    MRN:  [id number]  SERVICE DATE:  12/31/2024   SERVICE TIME:  5:24 AM    Primary Care Physician: Jerod Alford MD     SUBJECTIVE  CHIEF COMPLAINT: Chest pain    HPI:  Polo Rodriguez is a 89 y.o. male with past medical history of BPH, hypertension, hyperlipidemia comes to the ED due to chest pain that woke him up around 11 PM yesterday.  Patient states pain pain was located around his lower chest and rates pain 2 out of 10.  He denies history of similar episode in the past.  Patient denies fever, shortness of breath, palpitation, abdominal pain, leg pain or worsening leg swelling.     At the ED patient was vitally stable.  EKG showed normal sinus rhythm with no ST abnormalities.  He had a troponin that which showed a slight troponin leak      PAST MEDICAL HISTORY:    Past Medical History:   Diagnosis Date    Hyperlipidemia     Hypertension, well controlled     Melanoma in situ of cheek (HCC)      PAST SURGICAL HISTORY:    Past Surgical History:   Procedure Laterality Date    CATARACT REMOVAL  5/2012    bilateral    COLONOSCOPY  7/23/2008     FAMILY HISTORY:  History reviewed. No pertinent family history.  SOCIAL HISTORY:    Social History     Socioeconomic History    Marital status:      Spouse name: Not on file    Number of children: Not on file    Years of education: Not on file    Highest education level: Not on file   Occupational History    Not on file   Tobacco Use    Smoking status: Former    Smokeless tobacco: Never   Vaping Use    Vaping status: Never Used   Substance and Sexual Activity    Alcohol use: Never    Drug use: Never    Sexual activity: Not on file   Other Topics Concern    Not on file   Social History Narrative    Not on file     Social Determinants of Health     Financial Resource Strain: Low Risk  (3/5/2024)    Overall Financial Resource Strain (CARDIA)     Difficulty of Paying Living

## 2024-12-31 NOTE — ED NOTES
Report given to 1W RN  Pt placed on tele  Family at bedside updated of transfer  Tele box 2/correct pt verified with monitor room, pt sinus rhythm at time of transfer to 1W

## 2024-12-31 NOTE — DISCHARGE INSTRUCTIONS
Call [phone] to schedule your stress test after discharge.     -We added new blood pressure medications, losartan 100 mg daily, metoprolol tartrate 50 mg twice daily  -Stop your home medication hydralazine and metoprolol succinate.   -Check your blood pressure at home and keep blood pressure log and follow-up with PCP if need to adjust blood pressure medications further.

## 2025-01-01 ENCOUNTER — APPOINTMENT (OUTPATIENT)
Age: 89
DRG: 305 | End: 2025-01-01
Payer: MEDICARE

## 2025-01-01 LAB
BASOPHILS # BLD: 0 K/UL (ref 0–0.2)
BASOPHILS NFR BLD: 0.3 %
ECHO AO ROOT DIAM: 3 CM
ECHO AO ROOT INDEX: 1.62 CM/M2
ECHO AV AREA PEAK VELOCITY: 2.1 CM2
ECHO AV AREA PLAN/BSA: 1.24 CM2/M2
ECHO AV AREA PLAN: 2.3 CM2
ECHO AV AREA VTI: 1.9 CM2
ECHO AV AREA/BSA PEAK VELOCITY: 1.1 CM2/M2
ECHO AV AREA/BSA VTI: 1 CM2/M2
ECHO AV CUSP MM: 1.3 CM
ECHO AV MEAN GRADIENT: 3 MMHG
ECHO AV MEAN VELOCITY: 0.9 M/S
ECHO AV PEAK GRADIENT: 6 MMHG
ECHO AV PEAK VELOCITY: 1.2 M/S
ECHO AV VELOCITY RATIO: 0.83
ECHO AV VTI: 30.1 CM
ECHO BSA: 1.83 M2
ECHO EST RA PRESSURE: 3 MMHG
ECHO LA DIAMETER INDEX: 1.68 CM/M2
ECHO LA DIAMETER: 3.1 CM
ECHO LA TO AORTIC ROOT RATIO: 1.03
ECHO LA VOL A-L A2C: 49 ML (ref 18–58)
ECHO LA VOL A-L A4C: 42 ML (ref 18–58)
ECHO LA VOL MOD A2C: 47 ML (ref 18–58)
ECHO LA VOL MOD A4C: 39 ML (ref 18–58)
ECHO LA VOLUME AREA LENGTH: 49 ML
ECHO LA VOLUME INDEX A-L A2C: 26 ML/M2 (ref 16–34)
ECHO LA VOLUME INDEX A-L A4C: 23 ML/M2 (ref 16–34)
ECHO LA VOLUME INDEX AREA LENGTH: 26 ML/M2 (ref 16–34)
ECHO LA VOLUME INDEX MOD A2C: 25 ML/M2 (ref 16–34)
ECHO LA VOLUME INDEX MOD A4C: 21 ML/M2 (ref 16–34)
ECHO LV E' LATERAL VELOCITY: 7.89 CM/S
ECHO LV E' SEPTAL VELOCITY: 6.99 CM/S
ECHO LV EDV A2C: 70 ML
ECHO LV EDV A4C: 62 ML
ECHO LV EDV BP: 68 ML (ref 67–155)
ECHO LV EDV INDEX A4C: 34 ML/M2
ECHO LV EDV INDEX BP: 37 ML/M2
ECHO LV EDV NDEX A2C: 38 ML/M2
ECHO LV EJECTION FRACTION A2C: 61 %
ECHO LV EJECTION FRACTION A4C: 74 %
ECHO LV EJECTION FRACTION BIPLANE: 69 % (ref 55–100)
ECHO LV ESV A2C: 27 ML
ECHO LV ESV A4C: 16 ML
ECHO LV ESV BP: 21 ML (ref 22–58)
ECHO LV ESV INDEX A2C: 15 ML/M2
ECHO LV ESV INDEX A4C: 9 ML/M2
ECHO LV ESV INDEX BP: 11 ML/M2
ECHO LV FRACTIONAL SHORTENING: 38 % (ref 28–44)
ECHO LV INTERNAL DIMENSION DIASTOLE INDEX: 2 CM/M2
ECHO LV INTERNAL DIMENSION DIASTOLIC: 3.7 CM (ref 4.2–5.9)
ECHO LV INTERNAL DIMENSION SYSTOLIC INDEX: 1.24 CM/M2
ECHO LV INTERNAL DIMENSION SYSTOLIC: 2.3 CM
ECHO LV IVSD: 1.1 CM (ref 0.6–1)
ECHO LV IVSS: 1.5 CM
ECHO LV MASS 2D: 147.3 G (ref 88–224)
ECHO LV MASS INDEX 2D: 79.6 G/M2 (ref 49–115)
ECHO LV POSTERIOR WALL DIASTOLIC: 1.3 CM (ref 0.6–1)
ECHO LV POSTERIOR WALL SYSTOLIC: 1.2 CM
ECHO LV RELATIVE WALL THICKNESS RATIO: 0.7
ECHO LVOT AREA: 2.5 CM2
ECHO LVOT AV VTI INDEX: 0.74
ECHO LVOT DIAM: 1.8 CM
ECHO LVOT MEAN GRADIENT: 2 MMHG
ECHO LVOT PEAK GRADIENT: 4 MMHG
ECHO LVOT PEAK VELOCITY: 1 M/S
ECHO LVOT STROKE VOLUME INDEX: 30.7 ML/M2
ECHO LVOT SV: 56.7 ML
ECHO LVOT VTI: 22.3 CM
ECHO MV A VELOCITY: 1.03 M/S
ECHO MV AREA PHT: 2.7 CM2
ECHO MV AREA VTI: 2.1 CM2
ECHO MV E DECELERATION TIME (DT): 170.1 MS
ECHO MV E VELOCITY: 0.83 M/S
ECHO MV E/A RATIO: 0.81
ECHO MV E/E' LATERAL: 10.52
ECHO MV E/E' RATIO (AVERAGED): 11.2
ECHO MV E/E' SEPTAL: 11.87
ECHO MV LVOT VTI INDEX: 1.23
ECHO MV MAX VELOCITY: 1.2 M/S
ECHO MV MEAN GRADIENT: 3 MMHG
ECHO MV MEAN VELOCITY: 0.8 M/S
ECHO MV PEAK GRADIENT: 6 MMHG
ECHO MV PRESSURE HALF TIME (PHT): 80.2 MS
ECHO MV VTI: 27.5 CM
ECHO PV MAX VELOCITY: 0.7 M/S
ECHO PV PEAK GRADIENT: 2 MMHG
ECHO RIGHT VENTRICULAR SYSTOLIC PRESSURE (RVSP): 34 MMHG
ECHO RV INTERNAL DIMENSION: 2.7 CM
ECHO RV TAPSE: 2.1 CM (ref 1.7–?)
ECHO TV REGURGITANT MAX VELOCITY: 2.8 M/S
ECHO TV REGURGITANT PEAK GRADIENT: 31 MMHG
EOSINOPHIL # BLD: 0.1 K/UL (ref 0–0.7)
EOSINOPHIL NFR BLD: 1.6 %
ERYTHROCYTE [DISTWIDTH] IN BLOOD BY AUTOMATED COUNT: 12.7 % (ref 11.5–14.5)
HCT VFR BLD AUTO: 35.6 % (ref 42–52)
HGB BLD-MCNC: 11.9 G/DL (ref 14–18)
LYMPHOCYTES # BLD: 1.3 K/UL (ref 1–4.8)
LYMPHOCYTES NFR BLD: 15 %
MCH RBC QN AUTO: 31.9 PG (ref 27–31.3)
MCHC RBC AUTO-ENTMCNC: 33.4 % (ref 33–37)
MCV RBC AUTO: 95.4 FL (ref 79–92.2)
MONOCYTES # BLD: 1 K/UL (ref 0.2–0.8)
MONOCYTES NFR BLD: 11.5 %
NEUTROPHILS # BLD: 6.1 K/UL (ref 1.4–6.5)
NEUTS SEG NFR BLD: 71.3 %
PLATELET # BLD AUTO: 178 K/UL (ref 130–400)
RBC # BLD AUTO: 3.73 M/UL (ref 4.7–6.1)
TROPONIN, HIGH SENSITIVITY: 46 NG/L (ref 0–19)
WBC # BLD AUTO: 8.6 K/UL (ref 4.8–10.8)

## 2025-01-01 PROCEDURE — 6370000000 HC RX 637 (ALT 250 FOR IP): Performed by: INTERNAL MEDICINE

## 2025-01-01 PROCEDURE — G0378 HOSPITAL OBSERVATION PER HR: HCPCS

## 2025-01-01 PROCEDURE — 84484 ASSAY OF TROPONIN QUANT: CPT

## 2025-01-01 PROCEDURE — 6370000000 HC RX 637 (ALT 250 FOR IP)

## 2025-01-01 PROCEDURE — 85025 COMPLETE CBC W/AUTO DIFF WBC: CPT

## 2025-01-01 PROCEDURE — G0378 HOSPITAL OBSERVATION PER HR: HCPCS | Performed by: STUDENT IN AN ORGANIZED HEALTH CARE EDUCATION/TRAINING PROGRAM

## 2025-01-01 PROCEDURE — 2500000003 HC RX 250 WO HCPCS

## 2025-01-01 PROCEDURE — 93306 TTE W/DOPPLER COMPLETE: CPT

## 2025-01-01 PROCEDURE — 36415 COLL VENOUS BLD VENIPUNCTURE: CPT

## 2025-01-01 PROCEDURE — 93306 TTE W/DOPPLER COMPLETE: CPT | Performed by: INTERNAL MEDICINE

## 2025-01-01 PROCEDURE — 99232 SBSQ HOSP IP/OBS MODERATE 35: CPT | Performed by: INTERNAL MEDICINE

## 2025-01-01 PROCEDURE — 6360000002 HC RX W HCPCS

## 2025-01-01 RX ORDER — LOSARTAN POTASSIUM 50 MG/1
50 TABLET ORAL DAILY
Status: DISCONTINUED | OUTPATIENT
Start: 2025-01-02 | End: 2025-01-01

## 2025-01-01 RX ORDER — METOPROLOL TARTRATE 50 MG
50 TABLET ORAL 2 TIMES DAILY
Status: DISCONTINUED | OUTPATIENT
Start: 2025-01-01 | End: 2025-01-02 | Stop reason: HOSPADM

## 2025-01-01 RX ORDER — METOPROLOL TARTRATE 25 MG/1
25 TABLET, FILM COATED ORAL 2 TIMES DAILY
Status: DISCONTINUED | OUTPATIENT
Start: 2025-01-01 | End: 2025-01-01

## 2025-01-01 RX ORDER — LOSARTAN POTASSIUM 100 MG/1
100 TABLET ORAL DAILY
Status: DISCONTINUED | OUTPATIENT
Start: 2025-01-02 | End: 2025-01-01

## 2025-01-01 RX ORDER — LOSARTAN POTASSIUM 50 MG/1
50 TABLET ORAL DAILY
Status: DISCONTINUED | OUTPATIENT
Start: 2025-01-02 | End: 2025-01-02

## 2025-01-01 RX ADMIN — SODIUM CHLORIDE, PRESERVATIVE FREE 10 ML: 5 INJECTION INTRAVENOUS at 07:46

## 2025-01-01 RX ADMIN — ASPIRIN 81 MG: 81 TABLET, CHEWABLE ORAL at 07:46

## 2025-01-01 RX ADMIN — FINASTERIDE 5 MG: 5 TABLET, FILM COATED ORAL at 07:46

## 2025-01-01 RX ADMIN — SODIUM CHLORIDE, PRESERVATIVE FREE 10 ML: 5 INJECTION INTRAVENOUS at 21:01

## 2025-01-01 RX ADMIN — METOPROLOL TARTRATE 50 MG: 50 TABLET, FILM COATED ORAL at 21:00

## 2025-01-01 RX ADMIN — METOPROLOL TARTRATE 25 MG: 25 TABLET, FILM COATED ORAL at 07:46

## 2025-01-01 RX ADMIN — TAMSULOSIN HYDROCHLORIDE 0.4 MG: 0.4 CAPSULE ORAL at 07:46

## 2025-01-01 RX ADMIN — LOSARTAN POTASSIUM 25 MG: 25 TABLET, FILM COATED ORAL at 07:46

## 2025-01-01 RX ADMIN — ENOXAPARIN SODIUM 40 MG: 100 INJECTION SUBCUTANEOUS at 07:46

## 2025-01-01 RX ADMIN — ATORVASTATIN CALCIUM 10 MG: 10 TABLET, FILM COATED ORAL at 07:46

## 2025-01-01 NOTE — PROGRESS NOTES
Cardiology Follow up Note    Subjective:  Feels better.  No further chest pain.  EF normal by echocardiogram      Review of Systems:   As noted in the HPI*    Objective:  BP (!) 187/86   Pulse 64   Temp 98.6 °F (37 °C) (Oral)   Resp 18   Ht 1.778 m (5' 10\")   Wt 68 kg (150 lb)   SpO2 95%   BMI 21.52 kg/m²   Vitals stable.   Constitutional: alert, cooperative, in no distress  Eyes: Conjunctiva clear; no scleral icturus. PERRLA   Respiratory: clear to auscultation bilaterally  Musculoskeletal: No chest wall tenderness. No clubbing or cyanosis.   Cardiovascular: regular rate and rhythm, S1, S2 normal, soft systolic murmur.  No click, rub or gallop. No carotid bruit. No JVD. Pulses 2+ and symmetric.   GI: soft, non-tender, non-distended. Bowel sounds normal. No masses,  no organomegaly  MSK: extremities normal, atraumatic, no clubbing. No chest wall pain.    Neurologic: Cranial nerves grossly intact.  No focal motor and/or sensory deficits.  Skin: No rashes or lesions. No cyanosis.       Intake/Output Summary (Last 24 hours) at 1/1/2025 1510  Last data filed at 1/1/2025 0656  Gross per 24 hour   Intake 480 ml   Output 275 ml   Net 205 ml       Medications:  metoprolol tartrate, 25 mg, BID  sodium chloride flush, 5-40 mL, 2 times per day  enoxaparin, 40 mg, Daily  aspirin, 81 mg, Daily  tamsulosin, 0.4 mg, Daily  finasteride, 5 mg, Daily  atorvastatin, 10 mg, Daily  losartan, 25 mg, Daily      sodium chloride      Recent Labs     12/31/24  0036 12/31/24  0037 01/01/25  0451   HGB  --  13.2* 11.9*   WBC  --  7.1 8.6   PLT  --  185 178     --   --    K 3.7  --   --    CO2 23  --   --    BUN 29*  --   --    MG 1.8  --   --    Creatinine 1.06    Troponin: 34, 28, 31, 31, 38, 46    Telemetry: Sinus rhythm heart rate 50s to 70      Echo (TTE) complete 01/01/2025  Interpretation Summary    Left Ventricle: Normal left ventricular systolic function with a visually estimated EF of 60 - 65%. Left ventricle size is

## 2025-01-01 NOTE — PROGRESS NOTES
INPATIENT PROGRESS NOTE  Patient : Polo Rodriguez  Gender: male  :  1932  AGE:  89 y.o.  MRN:  [id number]    SERVICE DATE:  2025   SERVICE TIME:  3:08 PM      SUBJECTIVE    INTERVAL HPI:   Patient was seen and evaluated at bedside, he remains afebrile, hypertensive today up to 180s over 90s.  He denies chest pain however he does feel some discomfort when his blood pressure is high.  Troponin levels plateaued, pending echo, cardiology following    MEDICATIONS:       metoprolol tartrate  25 mg Oral BID    sodium chloride flush  5-40 mL IntraVENous 2 times per day    enoxaparin  40 mg SubCUTAneous Daily    aspirin  81 mg Oral Daily    tamsulosin  0.4 mg Oral Daily    finasteride  5 mg Oral Daily    atorvastatin  10 mg Oral Daily    losartan  25 mg Oral Daily      sodium chloride         OBJECTIVE  PHYSICAL EXAM:   Vitals:    25 0500 25 0717 25 1110 25 1450   BP: (!) 199/89 (!) 188/85 (!) 160/91 (!) 187/86   Pulse: 76 70 60 64   Resp: 16 18 18 18   Temp:  98.1 °F (36.7 °C) 97.9 °F (36.6 °C) 98.6 °F (37 °C)   TempSrc:  Oral Oral Oral   SpO2: 94%  97% 95%   Weight:       Height:           Body mass index is 21.52 kg/m².    Const  General: cooperative    HEENT  Normal oropharyngeal mucosa without any ulcers or exudates    Eyes: Conjunctiva normal     Pulmonary   Auscultation: clear to auscultation , no crackles, no wheezes    Cardiovascular   Rate: normal rate  Rhythm: regular rhythm  Heart Sounds: S1 normal, S2 normal and no murmurs    GI  Inspection: non-distended  Palpation: soft, not firm and nontender. No rigidity or rebound.       Deferred     Neuro  General: alert, awake and oriented x3. No obvious new focal deficit     Musculoskeletal: normal range of motion     Extrem  General: no cyanosis,  no pedal edema    Psych  Appearance: appropriate affect. Grossly normal     DATA:     Recent Results (from the past 24 hour(s))   Troponin    Collection Time: 24  3:45 PM   Result

## 2025-01-02 ENCOUNTER — APPOINTMENT (OUTPATIENT)
Dept: NUCLEAR MEDICINE | Age: 89
DRG: 305 | End: 2025-01-02
Payer: MEDICARE

## 2025-01-02 ENCOUNTER — HOSPITAL ENCOUNTER (OUTPATIENT)
Age: 89
Discharge: HOME | End: 2025-01-02

## 2025-01-02 VITALS
HEART RATE: 61 BPM | WEIGHT: 150 LBS | HEIGHT: 70 IN | BODY MASS INDEX: 21.47 KG/M2 | OXYGEN SATURATION: 96 % | RESPIRATION RATE: 14 BRPM | SYSTOLIC BLOOD PRESSURE: 166 MMHG | DIASTOLIC BLOOD PRESSURE: 76 MMHG | TEMPERATURE: 98.2 F

## 2025-01-02 LAB
EKG ATRIAL RATE: 70 BPM
EKG P AXIS: 0 DEGREES
EKG P-R INTERVAL: 282 MS
EKG Q-T INTERVAL: 406 MS
EKG QRS DURATION: 84 MS
EKG QTC CALCULATION (BAZETT): 438 MS
EKG R AXIS: -23 DEGREES
EKG T AXIS: 34 DEGREES
EKG VENTRICULAR RATE: 70 BPM

## 2025-01-02 PROCEDURE — 6370000000 HC RX 637 (ALT 250 FOR IP)

## 2025-01-02 PROCEDURE — 6360000002 HC RX W HCPCS

## 2025-01-02 PROCEDURE — 2500000003 HC RX 250 WO HCPCS

## 2025-01-02 PROCEDURE — 93005 ELECTROCARDIOGRAM TRACING: CPT

## 2025-01-02 PROCEDURE — 6370000000 HC RX 637 (ALT 250 FOR IP): Performed by: INTERNAL MEDICINE

## 2025-01-02 PROCEDURE — 6370000000 HC RX 637 (ALT 250 FOR IP): Performed by: STUDENT IN AN ORGANIZED HEALTH CARE EDUCATION/TRAINING PROGRAM

## 2025-01-02 PROCEDURE — 6360000002 HC RX W HCPCS: Performed by: STUDENT IN AN ORGANIZED HEALTH CARE EDUCATION/TRAINING PROGRAM

## 2025-01-02 PROCEDURE — 2060000000 HC ICU INTERMEDIATE R&B

## 2025-01-02 RX ORDER — METOPROLOL TARTRATE 50 MG
50 TABLET ORAL 2 TIMES DAILY
Qty: 60 TABLET | Refills: 3 | Status: SHIPPED | OUTPATIENT
Start: 2025-01-02

## 2025-01-02 RX ORDER — SODIUM CHLORIDE 0.9 % (FLUSH) 0.9 %
10 SYRINGE (ML) INJECTION PRN
Status: DISCONTINUED | OUTPATIENT
Start: 2025-01-02 | End: 2025-01-02 | Stop reason: HOSPADM

## 2025-01-02 RX ORDER — HYDRALAZINE HYDROCHLORIDE 20 MG/ML
10 INJECTION INTRAMUSCULAR; INTRAVENOUS EVERY 6 HOURS PRN
Status: DISCONTINUED | OUTPATIENT
Start: 2025-01-02 | End: 2025-01-02 | Stop reason: HOSPADM

## 2025-01-02 RX ORDER — REGADENOSON 0.08 MG/ML
0.4 INJECTION, SOLUTION INTRAVENOUS
Status: DISCONTINUED | OUTPATIENT
Start: 2025-01-02 | End: 2025-01-02 | Stop reason: HOSPADM

## 2025-01-02 RX ORDER — LOSARTAN POTASSIUM 100 MG/1
100 TABLET ORAL DAILY
Status: DISCONTINUED | OUTPATIENT
Start: 2025-01-02 | End: 2025-01-02 | Stop reason: HOSPADM

## 2025-01-02 RX ORDER — LOSARTAN POTASSIUM 100 MG/1
100 TABLET ORAL DAILY
Qty: 30 TABLET | Refills: 3 | Status: SHIPPED | OUTPATIENT
Start: 2025-01-03

## 2025-01-02 RX ADMIN — HYDRALAZINE HYDROCHLORIDE 10 MG: 20 INJECTION INTRAMUSCULAR; INTRAVENOUS at 08:32

## 2025-01-02 RX ADMIN — METOPROLOL TARTRATE 50 MG: 50 TABLET, FILM COATED ORAL at 08:31

## 2025-01-02 RX ADMIN — FINASTERIDE 5 MG: 5 TABLET, FILM COATED ORAL at 08:32

## 2025-01-02 RX ADMIN — ENOXAPARIN SODIUM 40 MG: 100 INJECTION SUBCUTANEOUS at 08:32

## 2025-01-02 RX ADMIN — ASPIRIN 81 MG: 81 TABLET, CHEWABLE ORAL at 08:36

## 2025-01-02 RX ADMIN — SODIUM CHLORIDE, PRESERVATIVE FREE 10 ML: 5 INJECTION INTRAVENOUS at 08:33

## 2025-01-02 RX ADMIN — LOSARTAN POTASSIUM 100 MG: 50 TABLET, FILM COATED ORAL at 08:31

## 2025-01-02 RX ADMIN — ATORVASTATIN CALCIUM 10 MG: 10 TABLET, FILM COATED ORAL at 08:31

## 2025-01-02 RX ADMIN — TAMSULOSIN HYDROCHLORIDE 0.4 MG: 0.4 CAPSULE ORAL at 08:32

## 2025-01-02 NOTE — CARE COORDINATION
Case Management Assessment  Initial Evaluation    Date/Time of Evaluation: 1/2/2025 1:22 PM  Assessment Completed by: Yamileth Shipley RN    If patient is discharged prior to next notation, then this note serves as note for discharge by case management.    Patient Name: Polo Rodriguez                   YOB: 1932  Diagnosis: Chest pain [R07.9]  Chest pain, unspecified type [R07.9]                   Date / Time: 12/31/2024 12:26 AM    Patient Admission Status: Inpatient   Readmission Risk (Low < 19, Mod (19-27), High > 27): Readmission Risk Score: 12    Current PCP: Jerod Alford MD  PCP verified by CM? Yes    Chart Reviewed: Yes      History Provided by: Medical Record  Patient Orientation: Alert and Oriented, Person, Place, Situation    Patient Cognition: Alert    Hospitalization in the last 30 days (Readmission):  No    If yes, Readmission Assessment in CM Navigator will be completed.    Advance Directives:      Code Status: Full Code   Patient's Primary Decision Maker is: Legal Next of Kin    Primary Decision Maker: Polo Rodriguez - Child - [phone]    Secondary Decision Maker: Olivia Tirado - Child - [phone]    Supplemental (Other) Decision Maker: [place] - Spouse - [phone]    Discharge Planning:    Patient lives with:  ([facility]) Type of Home: Assisted living  Primary Care Giver: Self ([facility])  Patient Support Systems include: Family Members   Current Financial resources:    Current community resources:    Current services prior to admission: None            Current DME:              Type of Home Care services:  None    ADLS  Prior functional level: Independent in ADLs/IADLs  Current functional level: Independent in ADLs/IADLs    PT AM-PAC:   /24  OT AM-PAC:   /24    Family can provide assistance at DC: No (KOV)  Would you like Case Management to discuss the discharge plan with any other family members/significant others, and if so, who?

## 2025-01-02 NOTE — DISCHARGE SUMMARY
WITHOUT CONTRAST  12/31/2024 3:21 am TECHNIQUE: CT of the head was performed without the administration of intravenous contrast. Automated exposure control, iterative reconstruction, and/or weight based adjustment of the mA/kV was utilized to reduce the radiation dose to as low as reasonably achievable. COMPARISON: 12/28/2024 HISTORY: ORDERING SYSTEM PROVIDED HISTORY: dizziness TECHNOLOGIST PROVIDED HISTORY: Reason for exam:->dizziness Has a \"code stroke\" or \"stroke alert\" been called?->No Decision Support Exception - unselect if not a suspected or confirmed emergency medical condition->Emergency Medical Condition (MA) What reading provider will be dictating this exam?->CRC FINDINGS: BRAIN/VENTRICLES: There is no acute intracranial hemorrhage, mass effect or midline shift.  No abnormal extra-axial fluid collection.  The gray-white differentiation is maintained without evidence of an acute infarct.  There is no evidence of hydrocephalus.  There are nonspecific hypoattenuating foci in the subcortical and periventricular white matter that most likely represent chronic microangiopathic ischemic changes in a patient of this age. ORBITS: The visualized portion of the orbits demonstrate no acute abnormality. SINUSES: The visualized paranasal sinuses and mastoid air cells demonstrate no acute abnormality. SOFT TISSUES/SKULL:  No acute abnormality of the visualized skull or soft tissues.     No acute intracranial abnormality.     XR CHEST PORTABLE    Result Date: 12/31/2024  EXAMINATION: ONE XRAY VIEW OF THE CHEST 12/31/2024 12:44 am COMPARISON: None. HISTORY: ORDERING SYSTEM PROVIDED HISTORY: Chest Pain TECHNOLOGIST PROVIDED HISTORY: Reason for exam:->Chest Pain What reading provider will be dictating this exam?->CRC FINDINGS: The lungs are without acute focal process.  Bibasilar atelectasis.  There is no effusion or pneumothorax. The cardiomediastinal silhouette is without acute process. The osseous structures are without

## 2025-01-02 NOTE — DISCHARGE INSTR - DIET

## 2025-01-03 ENCOUNTER — CARE COORDINATION (OUTPATIENT)
Dept: CARE COORDINATION | Age: 89
End: 2025-01-03

## 2025-01-03 DIAGNOSIS — R07.9 CHEST PAIN, UNSPECIFIED TYPE: Primary | ICD-10-CM

## 2025-01-03 LAB
EKG ATRIAL RATE: 63 BPM
EKG ATRIAL RATE: 73 BPM
EKG P AXIS: 29 DEGREES
EKG P AXIS: 32 DEGREES
EKG P-R INTERVAL: 262 MS
EKG P-R INTERVAL: 320 MS
EKG Q-T INTERVAL: 402 MS
EKG Q-T INTERVAL: 428 MS
EKG QRS DURATION: 82 MS
EKG QRS DURATION: 84 MS
EKG QTC CALCULATION (BAZETT): 437 MS
EKG QTC CALCULATION (BAZETT): 442 MS
EKG R AXIS: -14 DEGREES
EKG R AXIS: -18 DEGREES
EKG T AXIS: 42 DEGREES
EKG T AXIS: 46 DEGREES
EKG VENTRICULAR RATE: 63 BPM
EKG VENTRICULAR RATE: 73 BPM

## 2025-01-03 PROCEDURE — 1111F DSCHRG MED/CURRENT MED MERGE: CPT | Performed by: FAMILY MEDICINE

## 2025-01-03 NOTE — CARE COORDINATION
Care Transitions Note    Initial Call - Call within 2 business days of discharge: Yes    Patient Current Location:  Home: [facility]  [address]  [place] [address]    Care Transition Nurse contacted the patient by telephone to perform post hospital discharge assessment, verified name and  as identifiers. Provided introduction to self, and explanation of the Care Transition Nurse role.     Patient: Polo Rodriguez    Patient : 1932   MRN: [id number]    Reason for Admission: Chest pain  Discharge Date: 25  RURS: Readmission Risk Score: 12      Last Discharge Facility       Date Complaint Diagnosis Description Type Department Provider    24 Chest Pain Chest pain, unspecified type ED to Hosp-Admission (Discharged) (ADMITTED) Jayda Hoffmann MD; Mariia Heaton...            Was this an external facility discharge? No    Additional needs identified to be addressed with provider   No needs identified             Method of communication with provider: none.    Patients top risk factors for readmission: medical condition-HTN    Interventions to address risk factors:   Review of patient management of conditions/medications: reviewed medications appts, symptoms    Care Summary Note: Spoke to Polo for initial transitions call. Patient was admitted for CP, echocardiogram was done which showed normal EF and LV function. Recommended to have OP stress test. BP elevated, medications changed to losartan 100 MG daily, Lopressor 50 MG bid. Pt stopped hydralazine and Toprol XL. No CP/pressure, palpitations, SOB since discharge. Pt lives in Asst Living at [facility], spouse is in hospice there. Pt manages his own medications. PCP HFU is 1/10. Stated his daughter is there assisting since discharge. No needs or concerns at this time.     Care Transition Nurse reviewed discharge instructions with patient. The patient was given an opportunity to ask questions; all questions

## 2025-01-06 LAB
EKG ATRIAL RATE: 63 BPM
EKG P AXIS: -11 DEGREES
EKG P-R INTERVAL: 284 MS
EKG Q-T INTERVAL: 414 MS
EKG QRS DURATION: 76 MS
EKG QTC CALCULATION (BAZETT): 423 MS
EKG R AXIS: -23 DEGREES
EKG T AXIS: 5 DEGREES
EKG VENTRICULAR RATE: 63 BPM

## 2025-01-10 ENCOUNTER — OFFICE VISIT (OUTPATIENT)
Dept: FAMILY MEDICINE CLINIC | Age: 89
End: 2025-01-10

## 2025-01-10 VITALS
TEMPERATURE: 98.6 F | WEIGHT: 151 LBS | OXYGEN SATURATION: 98 % | HEART RATE: 65 BPM | SYSTOLIC BLOOD PRESSURE: 160 MMHG | DIASTOLIC BLOOD PRESSURE: 92 MMHG | BODY MASS INDEX: 21.62 KG/M2 | HEIGHT: 70 IN

## 2025-01-10 DIAGNOSIS — Z09 HOSPITAL DISCHARGE FOLLOW-UP: Primary | ICD-10-CM

## 2025-01-10 DIAGNOSIS — I16.0 HYPERTENSIVE URGENCY: ICD-10-CM

## 2025-01-10 DIAGNOSIS — R42 ORTHOSTATIC LIGHTHEADEDNESS: ICD-10-CM

## 2025-01-10 DIAGNOSIS — F41.9 ANXIETY: ICD-10-CM

## 2025-01-10 DIAGNOSIS — I10 ESSENTIAL HYPERTENSION: ICD-10-CM

## 2025-01-10 DIAGNOSIS — R07.9 CHEST PAIN, UNSPECIFIED TYPE: ICD-10-CM

## 2025-01-10 DIAGNOSIS — Z63.6 CAREGIVER STRESS: ICD-10-CM

## 2025-01-10 DIAGNOSIS — R42 DIZZINESS: ICD-10-CM

## 2025-01-10 RX ORDER — MIRTAZAPINE 7.5 MG/1
7.5 TABLET, FILM COATED ORAL NIGHTLY
Qty: 30 TABLET | Refills: 5 | Status: SHIPPED | OUTPATIENT
Start: 2025-01-10

## 2025-01-10 RX ORDER — LOSARTAN POTASSIUM 100 MG/1
100 TABLET ORAL DAILY
Qty: 90 TABLET | Refills: 1 | Status: SHIPPED | OUTPATIENT
Start: 2025-01-10

## 2025-01-10 SDOH — SOCIAL STABILITY - SOCIAL INSECURITY: DEPENDENT RELATIVE NEEDING CARE AT HOME: Z63.6

## 2025-01-10 ASSESSMENT — PATIENT HEALTH QUESTIONNAIRE - PHQ9
SUM OF ALL RESPONSES TO PHQ QUESTIONS 1-9: 0
SUM OF ALL RESPONSES TO PHQ QUESTIONS 1-9: 0
SUM OF ALL RESPONSES TO PHQ9 QUESTIONS 1 & 2: 0
1. LITTLE INTEREST OR PLEASURE IN DOING THINGS: NOT AT ALL
SUM OF ALL RESPONSES TO PHQ QUESTIONS 1-9: 0
SUM OF ALL RESPONSES TO PHQ QUESTIONS 1-9: 0
2. FEELING DOWN, DEPRESSED OR HOPELESS: NOT AT ALL

## 2025-01-10 NOTE — PROGRESS NOTES
underlying CAD cannot be reliably excluded given his age/risk factors.   Hypertensive urgency - improved.   Hypertension with possible hypertensive heart disease.   Hyperlipidemia  [facility]      Hospital Course:   Polo Rodriguez is a 89 y.o. male that was admitted and treated at [facility] chest pain.  Patient was seen and evaluated by cardiology during hospital course, troponin levels were trended with a flat pattern.  Echocardiogram was done which showed normal EF and LV function.  As per cardiology plan for outpatient stress test recommended after discharge.  Patient was noted with elevated blood pressure for which further adjustments of medications was required.  Patient was titrated up to 100 mg of losartan and metoprolol 50 mg twice daily with improvement in his blood pressure.  Discussed with patient bedside, all questions answered, patient is in agreement and comfortable with discharge plan at this time, patient was cleared by cardiology for discharge.  He was to follow-up with his PCP and cardiology as outpatient.     Interval history/Current status: intermittent lightheadedness     Patient Active Problem List   Diagnosis    Hyperlipidemia    Hypertension    BPH (benign prostatic hyperplasia)    Closed nondisplaced fracture of fifth cervical vertebra (HCC)    Chest pain       Medications listed as ordered at the time of discharge from hospital     Medication List            Accurate as of January 10, 2025  1:18 PM. If you have any questions, ask your nurse or doctor.                START taking these medications      mirtazapine 7.5 MG tablet  Commonly known as: REMERON  Take 1 tablet by mouth nightly  Started by: Dr. Jerod Alford MD            CONTINUE taking these medications      aspirin 81 MG EC tablet     finasteride 5 MG tablet  Commonly known as: PROSCAR  Take 1 tablet by mouth daily     losartan 100 MG tablet  Commonly known as: COZAAR  Take 1 tablet by mouth daily

## 2025-01-14 ENCOUNTER — CARE COORDINATION (OUTPATIENT)
Dept: CARE COORDINATION | Age: 89
End: 2025-01-14

## 2025-01-14 NOTE — CARE COORDINATION
Care Transitions Note    Follow Up Call     Attempted to reach patient for transitions of care follow up.  Unable to reach patient.      Outreach Attempts:   HIPAA compliant voicemail left for patient.     Care Summary Note: Unable to reach for transitions call, first attempt. Left VM requesting call back.     Follow Up Appointment:   Future Appointments         Provider Specialty Dept Phone    1/24/2025 10:20 AM Arianna Mejia DO Cardiology [phone]    3/7/2025 8:30 AM Jerod Alford MD Family Medicine [phone]            Plan for follow-up call in 2-5 days based on severity of symptoms and risk factors. Plan for next call: symptom management-2nd attempt sub, card referral for OP stress test, mirtazapine for sleep, stopped metoprolol?    Lilliana Nelson RN

## 2025-01-16 ENCOUNTER — CARE COORDINATION (OUTPATIENT)
Dept: CARE COORDINATION | Age: 89
End: 2025-01-16

## 2025-01-16 NOTE — CARE COORDINATION
Care Transitions Note    Follow Up Call     Patient Current Location:  Home: [facility] [address]  [place] [place] [facility] [address]    Care Transition Nurse contacted the patient by telephone. Verified name and  as identifiers.    Additional needs identified to be addressed with provider   No needs identified                 Method of communication with provider: none.    Care Summary Note: Spoke to Polo for transitions call. Stated he canceled Cardiology appt d/t too many upcoming appts, declined asst rescheduling. Stated his daughter is making appts. Pt unsure if he wants to get OP stress test  Not taking prescribed mirtazapine. Doing OK overall, denies any needs or concerns. Declines further transitions calls. CTN sign off for transitions.     Plan of care updates since last contact:  Review of patient management of conditions/medications: canceled Cardiology appt, not taking mirtazapine       Advance Care Planning:   Does patient have an Advance Directive: reviewed and current.    Medication Review:  Medications changed since last call, reviewed today.     Remote Patient Monitoring:  Offered patient enrollment in the Remote Patient Monitoring (RPM) program for in-home monitoring: Yes, but did not enroll at this time: controlled chronic disease management.    Assessments:  Care Transitions Subsequent and Final Call    Subsequent and Final Calls  Do you have any ongoing symptoms?: No  Have your medications changed?: Yes  Patient Reports: mirtazapine prn  Do you have any questions related to your medications?: No  Do you currently have any active services?: No  Do you have any needs or concerns that I can assist you with?: No  Identified Barriers: None  Care Transitions Interventions  Other Interventions:              Follow Up Appointment:   Reviewed upcoming appointment(s). and LEN appointment attended as scheduled   Future Appointments         Provider Specialty Dept Phone    2025

## 2025-01-22 NOTE — TELEPHONE ENCOUNTER
Patients daughter Calli, calling, asking if Dr. Alford can send the metoprolol to [facility], patient has tried to get a hold of the other doctors office, to try and get them, to send this over to [facility], and they have not been successful with that.

## 2025-01-23 RX ORDER — METOPROLOL TARTRATE 50 MG
50 TABLET ORAL 2 TIMES DAILY
Qty: 180 TABLET | Refills: 1 | Status: SHIPPED | OUTPATIENT
Start: 2025-01-23

## 2025-02-12 DIAGNOSIS — E78.5 HYPERLIPIDEMIA, UNSPECIFIED HYPERLIPIDEMIA TYPE: ICD-10-CM

## 2025-02-12 RX ORDER — SIMVASTATIN 20 MG
TABLET ORAL
Qty: 90 TABLET | Refills: 3 | Status: SHIPPED | OUTPATIENT
Start: 2025-02-12

## 2025-02-12 NOTE — TELEPHONE ENCOUNTER
Comments:     Last Office Visit (last PCP visit):   1/10/2025    Next Visit Date:  Future Appointments   Date Time Provider Department Center   3/7/2025  8:30 AM Jerod Alford MD [facility] DEP       **If hasn't been seen in over a year OR hasn't followed up according to last diabetes/ADHD visit, make appointment for patient before sending refill to provider.    Rx requested:  Requested Prescriptions     Pending Prescriptions Disp Refills    simvastatin (ZOCOR) 20 MG tablet [Pharmacy Med Name: Simvastatin 20 MG Oral Tablet] 90 tablet 3     Sig: TAKE 1 TABLET BY MOUTH AT NIGHT

## 2025-03-07 ENCOUNTER — OFFICE VISIT (OUTPATIENT)
Dept: FAMILY MEDICINE CLINIC | Age: 89
End: 2025-03-07
Payer: MEDICARE

## 2025-03-07 VITALS
OXYGEN SATURATION: 99 % | TEMPERATURE: 98 F | HEIGHT: 70 IN | DIASTOLIC BLOOD PRESSURE: 84 MMHG | WEIGHT: 141 LBS | BODY MASS INDEX: 20.19 KG/M2 | HEART RATE: 54 BPM | SYSTOLIC BLOOD PRESSURE: 120 MMHG

## 2025-03-07 DIAGNOSIS — R07.9 CHEST PAIN, UNSPECIFIED TYPE: ICD-10-CM

## 2025-03-07 DIAGNOSIS — E78.5 HYPERLIPIDEMIA, UNSPECIFIED HYPERLIPIDEMIA TYPE: Primary | ICD-10-CM

## 2025-03-07 DIAGNOSIS — F41.9 ANXIETY: ICD-10-CM

## 2025-03-07 DIAGNOSIS — R42 ORTHOSTATIC LIGHTHEADEDNESS: ICD-10-CM

## 2025-03-07 DIAGNOSIS — I10 ESSENTIAL HYPERTENSION: ICD-10-CM

## 2025-03-07 LAB
ALBUMIN SERPL-MCNC: 3.8 G/DL (ref 3.5–4.6)
ALP SERPL-CCNC: 144 U/L (ref 35–104)
ALT SERPL-CCNC: 32 U/L (ref 0–41)
ANION GAP SERPL CALCULATED.3IONS-SCNC: 9 MEQ/L (ref 9–15)
AST SERPL-CCNC: 41 U/L (ref 0–40)
BILIRUB SERPL-MCNC: 0.6 MG/DL (ref 0.2–0.7)
BUN SERPL-MCNC: 28 MG/DL (ref 8–23)
CALCIUM SERPL-MCNC: 9.2 MG/DL (ref 8.5–9.9)
CHLORIDE SERPL-SCNC: 102 MEQ/L (ref 95–107)
CHOLEST SERPL-MCNC: 100 MG/DL (ref 0–199)
CO2 SERPL-SCNC: 26 MEQ/L (ref 20–31)
CREAT SERPL-MCNC: 1.21 MG/DL (ref 0.7–1.2)
ERYTHROCYTE [DISTWIDTH] IN BLOOD BY AUTOMATED COUNT: 12.9 % (ref 11.5–14.5)
GLOBULIN SER CALC-MCNC: 3.1 G/DL (ref 2.3–3.5)
GLUCOSE SERPL-MCNC: 108 MG/DL (ref 70–99)
HCT VFR BLD AUTO: 42.1 % (ref 42–52)
HDLC SERPL-MCNC: 40 MG/DL (ref 40–59)
HGB BLD-MCNC: 13.9 G/DL (ref 14–18)
LDLC SERPL CALC-MCNC: 47 MG/DL (ref 0–129)
MCH RBC QN AUTO: 32 PG (ref 27–31.3)
MCHC RBC AUTO-ENTMCNC: 33 % (ref 33–37)
MCV RBC AUTO: 96.8 FL (ref 79–92.2)
PLATELET # BLD AUTO: 219 K/UL (ref 130–400)
POTASSIUM SERPL-SCNC: 5.2 MEQ/L (ref 3.4–4.9)
PROT SERPL-MCNC: 6.9 G/DL (ref 6.3–8)
RBC # BLD AUTO: 4.35 M/UL (ref 4.7–6.1)
SODIUM SERPL-SCNC: 137 MEQ/L (ref 135–144)
TRIGL SERPL-MCNC: 63 MG/DL (ref 0–150)
TSH SERPL-MCNC: 4.46 UIU/ML (ref 0.44–3.86)
WBC # BLD AUTO: 6.7 K/UL (ref 4.8–10.8)

## 2025-03-07 PROCEDURE — 99214 OFFICE O/P EST MOD 30 MIN: CPT | Performed by: FAMILY MEDICINE

## 2025-03-07 PROCEDURE — 1160F RVW MEDS BY RX/DR IN RCRD: CPT | Performed by: FAMILY MEDICINE

## 2025-03-07 PROCEDURE — 1036F TOBACCO NON-USER: CPT | Performed by: FAMILY MEDICINE

## 2025-03-07 PROCEDURE — 1159F MED LIST DOCD IN RCRD: CPT | Performed by: FAMILY MEDICINE

## 2025-03-07 PROCEDURE — G8420 CALC BMI NORM PARAMETERS: HCPCS | Performed by: FAMILY MEDICINE

## 2025-03-07 PROCEDURE — 1123F ACP DISCUSS/DSCN MKR DOCD: CPT | Performed by: FAMILY MEDICINE

## 2025-03-07 PROCEDURE — G8427 DOCREV CUR MEDS BY ELIG CLIN: HCPCS | Performed by: FAMILY MEDICINE

## 2025-03-07 PROCEDURE — 1126F AMNT PAIN NOTED NONE PRSNT: CPT | Performed by: FAMILY MEDICINE

## 2025-03-07 NOTE — PROGRESS NOTES
Chief Complaint   Patient presents with    Hypertension     6 month        HPI:  Polo Rodriguez is a 89 y.o. male    No acute concerns    [place] resident    Wife in hospice     Patient Active Problem List   Diagnosis    Hyperlipidemia    Hypertension    BPH (benign prostatic hyperplasia)    Closed nondisplaced fracture of fifth cervical vertebra (HCC)    Chest pain         Urologist - Dr. Sarah    Treatment Adherence:   Medication compliance:  compliant all of the time  Diet compliance:  compliant all of the time  Weight trend: stable  Current exercise: mowing lawn, golfing, caring for wife   Barriers: none    Hypertension:  Home blood pressure monitoring: Yes - good.  He is adherent to a low sodium diet. Patient denies chest pain, shortness of breath, headache, blurred vision, peripheral edema, palpitations, dry cough and fatigue.  Antihypertensive medication side effects: no medication side effects noted and orthostatic lightheadedness.  Use of agents associated with hypertension: none.                                        Sodium (mEq/L)   Date Value   12/31/2024 137    BUN (mg/dL)   Date Value   12/31/2024 29 (H)    Glucose (mg/dL)   Date Value   12/31/2024 130 (H)      Potassium (mEq/L)   Date Value   12/31/2024 3.7    Creatinine (mg/dL)   Date Value   12/31/2024 1.06         Hyperlipidemia:  No new myalgias or GI upset on simvastatin (Zocor).     Lab Results   Component Value Date    CHOL 126 03/05/2024    TRIG 69 03/05/2024    HDL 49 03/05/2024     Lab Results   Component Value Date    ALT 19 12/31/2024    AST 29 12/31/2024          BPH:  Doing ok on proscar and uroxatral      Past Medical History:   Diagnosis Date    Hyperlipidemia     Hypertension, well controlled     Melanoma in situ of cheek (HCC)      Past Surgical History:   Procedure Laterality Date    CATARACT REMOVAL  5/2012    bilateral    COLONOSCOPY  7/23/2008     History reviewed. No pertinent family history.  Social History     Socioeconomic

## 2025-03-10 ENCOUNTER — RESULTS FOLLOW-UP (OUTPATIENT)
Dept: FAMILY MEDICINE CLINIC | Age: 89
End: 2025-03-10

## 2025-05-08 RX ORDER — TAMSULOSIN HYDROCHLORIDE 0.4 MG/1
0.4 CAPSULE ORAL DAILY
Qty: 90 CAPSULE | Refills: 3 | Status: SHIPPED | OUTPATIENT
Start: 2025-05-08

## 2025-05-08 NOTE — TELEPHONE ENCOUNTER
Comments:     Last Office Visit (last PCP visit):   3/7/2025    Next Visit Date:  Future Appointments   Date Time Provider Department Center   9/8/2025  9:45 AM Jerod Alford MD [facility]       **If hasn't been seen in over a year OR hasn't followed up according to last diabetes/ADHD visit, make appointment for patient before sending refill to provider.    Rx requested:  Requested Prescriptions     Pending Prescriptions Disp Refills    tamsulosin (FLOMAX) 0.4 MG capsule [Pharmacy Med Name: Tamsulosin HCl 0.4 MG Oral Capsule] 90 capsule 3     Sig: TAKE 1 CAPSULE BY MOUTH DAILY

## 2025-06-25 DIAGNOSIS — I10 ESSENTIAL HYPERTENSION: ICD-10-CM

## 2025-06-25 RX ORDER — METOPROLOL TARTRATE 50 MG
50 TABLET ORAL 2 TIMES DAILY
Qty: 180 TABLET | Refills: 3 | Status: SHIPPED | OUTPATIENT
Start: 2025-06-25

## 2025-06-25 RX ORDER — LOSARTAN POTASSIUM 100 MG/1
100 TABLET ORAL DAILY
Qty: 90 TABLET | Refills: 3 | Status: SHIPPED | OUTPATIENT
Start: 2025-06-25

## 2025-06-25 NOTE — TELEPHONE ENCOUNTER
Comments:     Last Office Visit (last PCP visit):   3/7/2025    Next Visit Date:  Future Appointments   Date Time Provider Department Center   9/8/2025  9:45 AM Jerod Alford MD [facility]       **If hasn't been seen in over a year OR hasn't followed up according to last diabetes/ADHD visit, make appointment for patient before sending refill to provider.    Rx requested:  Requested Prescriptions     Pending Prescriptions Disp Refills    metoprolol tartrate (LOPRESSOR) 50 MG tablet [Pharmacy Med Name: Metoprolol Tartrate 50 MG Oral Tablet] 180 tablet 3     Sig: TAKE 1 TABLET BY MOUTH TWICE  DAILY    losartan (COZAAR) 100 MG tablet [Pharmacy Med Name: Losartan Potassium 100 MG Oral Tablet] 90 tablet 3     Sig: TAKE 1 TABLET BY MOUTH DAILY

## 2025-07-14 NOTE — TELEPHONE ENCOUNTER
Comments:     Last Office Visit (last PCP visit):   3/7/2025    Next Visit Date:  Future Appointments   Date Time Provider Department Center   9/8/2025  9:45 AM Jerod Alford MD [facility]       **If hasn't been seen in over a year OR hasn't followed up according to last diabetes/ADHD visit, make appointment for patient before sending refill to provider.    Rx requested:  Requested Prescriptions     Pending Prescriptions Disp Refills    finasteride (PROSCAR) 5 MG tablet [Pharmacy Med Name: Finasteride 5 MG Oral Tablet] 90 tablet 3     Sig: TAKE 1 TABLET BY MOUTH DAILY

## 2025-07-15 RX ORDER — FINASTERIDE 5 MG/1
5 TABLET, FILM COATED ORAL DAILY
Qty: 90 TABLET | Refills: 3 | Status: SHIPPED | OUTPATIENT
Start: 2025-07-15